# Patient Record
Sex: FEMALE | Race: WHITE | NOT HISPANIC OR LATINO | URBAN - METROPOLITAN AREA
[De-identification: names, ages, dates, MRNs, and addresses within clinical notes are randomized per-mention and may not be internally consistent; named-entity substitution may affect disease eponyms.]

---

## 2022-03-14 ENCOUNTER — EVALUATION (OUTPATIENT)
Dept: PHYSICAL THERAPY | Facility: CLINIC | Age: 37
End: 2022-03-14
Payer: COMMERCIAL

## 2022-03-14 DIAGNOSIS — M62.838 NECK MUSCLE SPASM: ICD-10-CM

## 2022-03-14 DIAGNOSIS — M54.2 NECK PAIN: ICD-10-CM

## 2022-03-14 DIAGNOSIS — M54.12 CERVICAL RADICULOPATHY: Primary | ICD-10-CM

## 2022-03-14 PROCEDURE — 97161 PT EVAL LOW COMPLEX 20 MIN: CPT

## 2022-03-14 NOTE — LETTER
2022    Nicolas [address]eda [phone]    Patient: Christina Kc   YOB: 1985   Date of Visit: 3/14/2022     Encounter Diagnosis     ICD-10-CM    1  Cervical radiculopathy  M54 12    2  Neck pain  M54 2    3  Neck muscle spasm  M62 838        Dear Dr Carmelita Brittle:    Thank you for your recent referral of Christina Kc  Please review the attached evaluation summary from [facility] recent visit  Please verify that you agree with the plan of care by signing the attached order  If you have any questions or concerns, please do not hesitate to call  I sincerely appreciate the opportunity to share in the care of one of your patients and hope to have another opportunity to work with you in the near future  Sincerely,    Alisa Nobles, PT      Referring Provider:      I certify that I have read the below Plan of Care and certify the need for these services furnished under this plan of treatment while under my care  Dali Villa MD  [facility] [address]  Via Fax: [phone]          PT Evaluation     Today's date: 3/14/2022  Patient name: Christina Kc  : 1985  MRN: [id number]  Referring provider: Margot Lanier MD  Dx:   Encounter Diagnosis     ICD-10-CM    1  Cervical radiculopathy  M54 12    2  Neck pain  M54 2    3  Neck muscle spasm  M62 838                   Assessment  Assessment details: Christina Kc is a 43 y o  female who presents with cervical derangement with pain, decreased strength, decreased ROM and postural  dysfunction  Due to these impairments, patient has difficulty performing ADL's, recreational activities, lifting/carrying, reaching  Patient noted to have difficulty completing all activities she needs to do in a day, due to increased pain and weakness in the left side of her neck and into her shoulder   Patient's clinical presentation is consistent with their referring diagnosis of Cervical radiculopathy  Patient has been educated in home exercise program and plan of care  Patient would benefit from skilled physical therapy services to address their aforementioned functional limitations and progress towards prior level of function and independence with home exercise program and self symptom management/resolution  Impairments: abnormal or restricted ROM, abnormal movement, activity intolerance, impaired physical strength, lacks appropriate home exercise program, pain with function, scapular dyskinesis, poor posture  and poor body mechanics  Other impairment: poor tolerance to prolonged static positions  Functional limitations: limited tolerance to driving, using computer, disturbed sleepUnderstanding of Dx/Px/POC: good   Prognosis: good    Goals  Short Term Goals:  4 weeks Target Date 4/11/22  1  Initiate and advance HEP toward self symptom reduction/resolution  2  Improve postural awareness and demonstrate self postural correction  3  Improve AROM cervical spine to min cordero or better t/o   4  Undisturbed sleep  5  Pt to tolerate prolonged driving x 1 hour or more w/o c/o  Long Term Goals:  12 weeks Target Date 6/6/22  1  Indep with HEP and self symptom management/prevention  2  Achieve cervical AROM to WNL w/o c/o   3  Demonstrate ability to lift 15# OH without increased pain,   4  Pt will report improved tolerance for sitting with her long days, using improved posture and with minimal pain reported  5  Patient will demonstrate consistent use of improved posture in clinic with carryover reported at home        Plan  Patient would benefit from: skilled PT and PT eval  Planned modality interventions: thermotherapy: hydrocollator packs, unattended electrical stimulation and cryotherapy  Planned therapy interventions: joint mobilization, manual therapy, patient education, postural training, activity modification, body mechanics training, home exercise program, neuromuscular re-education, strengthening, stretching, therapeutic activities and therapeutic exercise  Other planned therapy interventions: mechanical assessment  Frequency: 2x week  Duration in weeks: 12  Plan of Care beginning date: 3/14/2022  Plan of Care expiration date: 2022  Treatment plan discussed with: patient        Subjective Evaluation    History of Present Illness  Onset date: 1 month ago, recent bout with L side of neck  Mechanism of injury: Patient reports insidious onset of neck pain about 1 month ago  Has a remote history of neck pain with radiating symptoms down her right arm in 2019, for which she received PT at another clinic, and resolved extremity symptoms, but was unable to resolve central neck pain at that time, which resolved over time  Has had neck pain intermittently since then, but now with significant worsening of symptoms in the past month, without resolution or improvement             Recurrent probem    Pain  Current pain ratin (left side cervical spine)  At best pain rating: 3 (over the weekend, no specific change)  At worst pain ratin  Location: L side of neck, L shoulder  Quality: burning and radiating (heaches as well)  Relieving factors: ice and relaxation  Aggravating factors: overhead activity, lifting and sitting  Progression: worsening    Social Support  Steps to enter house: yes  13  Stairs in house: yes   13  Lives in: multiple-level home  Lives with: young children and spouse    Employment status: working (Part time as psychologist, 2 full days)  Hand dominance: left  Exercise history: walking, housework, playing with her children, stretching, pilates      Diagnostic Tests  MRI studies: abnormal  Treatments  Previous treatment: medication and physical therapy (PT for R side neck pain 2019 for several months)  Patient Goals  Patient goals for therapy: decreased pain, independence with ADLs/IADLs, return to sport/leisure activities, increased strength and increased motion  Patient goal: To be able to live without pain        Objective   **=painful  Cervical spine:   ROM:   Flexion: min limitation**  Extension: min limitation**  Sidebend L: mod limitation**  R: mod limitation**  Rotation L: min limitation** R: min limitation**    Repeated movement testing:   Repeated cervical retraction in sitting: 2x10 no significant change in pain but felt "good"  Repeated cervical retraction in supine with towel: x10 mild inc mobility, no sig change in pain but again felt "good"    Palpation: Patient noted to have significant tightness and pain with palpation over cervical and upper trap mm, especially on left side  Patient reported + response to deep pressure in these muscles  Posture: Patient noted to have decreased lumbar lordosis, increased thoracic kyphosis, forward head and rounded shoulders upon PT evaluation  Able to correct posture with cues, and increased ease with use of towel roll for lumbar support  Education provided on posture, cervical positioning, and improving positioning to decrease cervical strain  Precautions: cervical   Daily Exercise Log:    Date 3/14/22        Visit # 1                 Manual         Manual C'spine traction         IASTM UT/LevScap                           Neuro Re-Ed         Postural re-ed HEP        scap depression         Chin tucks supine HEP                                            Ther Ex         superman         Wall slides                                                               Ther Activity                           Gait Training                           Modalities                             HEP:   Access Code: [email or web address]  URL: [email or web address]  Date: 03/14/2022  Prepared by: Hernan Hurley    Exercises  · Supine Cervical Retraction with Towel - 3 x daily - 7 x weekly - 1-2 sets - 10 reps  · Seated Correct Posture - 1 x daily - 7 x weekly - 3 sets - 10 reps

## 2022-03-14 NOTE — PROGRESS NOTES
PT Evaluation     Today's date: 3/14/2022  Patient name: Alia Mcclain  : 1985  MRN: [id number]  Referring provider: Pauline Ro MD  Dx:   Encounter Diagnosis     ICD-10-CM    1  Cervical radiculopathy  M54 12    2  Neck pain  M54 2    3  Neck muscle spasm  M62 838                   Assessment  Assessment details: Alia Mcclain is a 43 y o  female who presents with cervical derangement with pain, decreased strength, decreased ROM and postural  dysfunction  Due to these impairments, patient has difficulty performing ADL's, recreational activities, lifting/carrying, reaching  Patient noted to have difficulty completing all activities she needs to do in a day, due to increased pain and weakness in the left side of her neck and into her shoulder  Patient's clinical presentation is consistent with their referring diagnosis of Cervical radiculopathy  Patient has been educated in home exercise program and plan of care  Patient would benefit from skilled physical therapy services to address their aforementioned functional limitations and progress towards prior level of function and independence with home exercise program and self symptom management/resolution  Impairments: abnormal or restricted ROM, abnormal movement, activity intolerance, impaired physical strength, lacks appropriate home exercise program, pain with function, scapular dyskinesis, poor posture  and poor body mechanics  Other impairment: poor tolerance to prolonged static positions  Functional limitations: limited tolerance to driving, using computer, disturbed sleepUnderstanding of Dx/Px/POC: good   Prognosis: good    Goals  Short Term Goals:  4 weeks Target Date 22  1  Initiate and advance HEP toward self symptom reduction/resolution  2  Improve postural awareness and demonstrate self postural correction  3  Improve AROM cervical spine to min cordero or better t/o   4  Undisturbed sleep    5  Pt to tolerate prolonged driving x 1 hour or more w/o c/o  Long Term Goals:  12 weeks Target Date 22  1  Indep with HEP and self symptom management/prevention  2  Achieve cervical AROM to WNL w/o c/o   3  Demonstrate ability to lift 15# OH without increased pain,   4  Pt will report improved tolerance for sitting with her long days, using improved posture and with minimal pain reported  5  Patient will demonstrate consistent use of improved posture in clinic with carryover reported at home  Plan  Patient would benefit from: skilled PT and PT eval  Planned modality interventions: thermotherapy: hydrocollator packs, unattended electrical stimulation and cryotherapy  Planned therapy interventions: joint mobilization, manual therapy, patient education, postural training, activity modification, body mechanics training, home exercise program, neuromuscular re-education, strengthening, stretching, therapeutic activities and therapeutic exercise  Other planned therapy interventions: mechanical assessment  Frequency: 2x week  Duration in weeks: 12  Plan of Care beginning date: 3/14/2022  Plan of Care expiration date: 2022  Treatment plan discussed with: patient        Subjective Evaluation    History of Present Illness  Onset date: 1 month ago, recent bout with L side of neck  Mechanism of injury: Patient reports insidious onset of neck pain about 1 month ago  Has a remote history of neck pain with radiating symptoms down her right arm in 2019, for which she received PT at another clinic, and resolved extremity symptoms, but was unable to resolve central neck pain at that time, which resolved over time  Has had neck pain intermittently since then, but now with significant worsening of symptoms in the past month, without resolution or improvement             Recurrent probem    Pain  Current pain ratin (left side cervical spine)  At best pain rating: 3 (over the weekend, no specific change)  At worst pain ratin  Location: L side of neck, L shoulder  Quality: burning and radiating (heaches as well)  Relieving factors: ice and relaxation  Aggravating factors: overhead activity, lifting and sitting  Progression: worsening    Social Support  Steps to enter house: yes  13  Stairs in house: yes   13  Lives in: multiple-level home  Lives with: young children and spouse    Employment status: working (Part time as psychologist, 2 full days)  Hand dominance: left  Exercise history: walking, housework, playing with her children, stretching, pilates      Diagnostic Tests  MRI studies: abnormal  Treatments  Previous treatment: medication and physical therapy (PT for R side neck pain 2019 for several months)  Patient Goals  Patient goals for therapy: decreased pain, independence with ADLs/IADLs, return to sport/leisure activities, increased strength and increased motion  Patient goal: To be able to live without pain        Objective   **=painful  Cervical spine:   ROM:   Flexion: min limitation**  Extension: min limitation**  Sidebend L: mod limitation**  R: mod limitation**  Rotation L: min limitation** R: min limitation**    Repeated movement testing:   Repeated cervical retraction in sitting: 2x10 no significant change in pain but felt "good"  Repeated cervical retraction in supine with towel: x10 mild inc mobility, no sig change in pain but again felt "good"    Palpation: Patient noted to have significant tightness and pain with palpation over cervical and upper trap mm, especially on left side  Patient reported + response to deep pressure in these muscles  Posture: Patient noted to have decreased lumbar lordosis, increased thoracic kyphosis, forward head and rounded shoulders upon PT evaluation  Able to correct posture with cues, and increased ease with use of towel roll for lumbar support  Education provided on posture, cervical positioning, and improving positioning to decrease cervical strain           Precautions: cervical   Daily Exercise Log:    Date 3/14/22        Visit # 1                 Manual         Manual C'spine traction         IASTM UT/LevScap                           Neuro Re-Ed         Postural re-ed HEP        scap depression         Chin tucks supine HEP                                            Ther Ex         superman         Wall slides                                                               Ther Activity                           Gait Training                           Modalities                             HEP:   Access Code: [email or web address]  URL: [email or web address]  Date: 03/14/2022  Prepared by: Grace Moon    Exercises  · Supine Cervical Retraction with Towel - 3 x daily - 7 x weekly - 1-2 sets - 10 reps  · Seated Correct Posture - 1 x daily - 7 x weekly - 3 sets - 10 reps

## 2022-03-16 ENCOUNTER — OFFICE VISIT (OUTPATIENT)
Dept: PHYSICAL THERAPY | Facility: CLINIC | Age: 37
End: 2022-03-16
Payer: COMMERCIAL

## 2022-03-16 DIAGNOSIS — M54.12 CERVICAL RADICULOPATHY: Primary | ICD-10-CM

## 2022-03-16 DIAGNOSIS — M62.838 NECK MUSCLE SPASM: ICD-10-CM

## 2022-03-16 DIAGNOSIS — M54.2 NECK PAIN: ICD-10-CM

## 2022-03-16 PROCEDURE — 97112 NEUROMUSCULAR REEDUCATION: CPT

## 2022-03-16 PROCEDURE — 97140 MANUAL THERAPY 1/> REGIONS: CPT

## 2022-03-16 NOTE — PROGRESS NOTES
Daily Note     Today's date: 3/16/2022  Patient name: Maria Fernanda Ruiz  : 1985  MRN: [id number]  Referring provider: Kori Rodarte MD  Dx:   Encounter Diagnosis     ICD-10-CM    1  Cervical radiculopathy  M54 12    2  Neck pain  M54 2    3  Neck muscle spasm  M62 838        Start Time: 0800  Stop Time: 0839  Total time in clinic (min): 39 minutes    Subjective: Patient reporting mildly improved neck pain, but with increased pain reported in lower neck/upper shoulder at top of shoulder blade and along the medial border  Feels good to perform cervical retractions in supine, reports performing 1x while at work  Objective: See treatment diary below      Assessment: Tolerated treatment well and without increased pain  Mildly improved headache noted  Improved scapular pain by end of session with mildly increased neck pain noted  Patient demonstrated fatigue post treatment, exhibited good technique with therapeutic exercises and would benefit from continued PT      Plan: Continue per plan of care  Progress treatment as tolerated  Precautions: cervical   Daily Exercise Log:    Date 3/14/22 3/16/22       Visit # 1 2                Manual  20'       Manual C'spine traction  LS-5'       IASTM UT/LevScap  LS       subscap release  LS       STM rhomboids, lev scap  LS       PA mobs cervical/thoracic  LS                         Neuro Re-Ed  10'       Postural re-ed HEP        scap depression         Chin tucks supine HEP 10x 5" hold       shld flx w/ back to wall  2x10       Wall angels  2x10       superman                  Ther Ex  5'       HEP  Updated & reviewed       Wall slides  15" x 10                                                             Ther Activity                           Gait Training                           Modalities         Mechanical traction                    HEP:   Access Code: [email or web address]  URL: [email or web address]  Date: 2022  Prepared by: Sunil roman Pilo    Exercises  · Supine Cervical Retraction with Towel - 3 x daily - 7 x weekly - 1-2 sets - 10 reps  · Seated Correct Posture - 1 x daily - 7 x weekly - 3 sets - 10 reps

## 2022-03-21 ENCOUNTER — OFFICE VISIT (OUTPATIENT)
Dept: PHYSICAL THERAPY | Facility: CLINIC | Age: 37
End: 2022-03-21
Payer: COMMERCIAL

## 2022-03-21 DIAGNOSIS — M62.838 NECK MUSCLE SPASM: ICD-10-CM

## 2022-03-21 DIAGNOSIS — M54.12 CERVICAL RADICULOPATHY: Primary | ICD-10-CM

## 2022-03-21 DIAGNOSIS — M54.2 NECK PAIN: ICD-10-CM

## 2022-03-21 PROCEDURE — 97112 NEUROMUSCULAR REEDUCATION: CPT

## 2022-03-21 PROCEDURE — 97140 MANUAL THERAPY 1/> REGIONS: CPT

## 2022-03-21 NOTE — PROGRESS NOTES
Daily Note     Today's date: 3/21/2022  Patient name: Jose Courtney  : 1985  MRN: [id number]  Referring provider: Laureano Tapia MD  Dx:   Encounter Diagnosis     ICD-10-CM    1  Cervical radiculopathy  M54 12    2  Neck pain  M54 2    3  Neck muscle spasm  M62 838        Start Time: 0845  Stop Time: 0930  Total time in clinic (min): 45 minutes    Subjective: Patient reports having increased neck spasm this morning upon awakening  Patient reports no significant strain, but was reclining on her bed yesterday with her head somewhat forward  Patient reports that her symptoms are gradually improving since start of care  Objective: See treatment diary below      Assessment: Tolerated treatment well and with improved symptoms by completion of session  Patient progressing with decreasing pain and improving ROM noted at end of session  Patient demonstrated fatigue post treatment, exhibited good technique with therapeutic exercises and would benefit from continued PT      Plan: Continue per plan of care  Progress treatment as tolerated         Precautions: cervical   Daily Exercise Log:    Date 3/14/22 3/16/22 3/21/22      Visit # 1 2 3               Manual  20' 15'      Manual C'spine traction  LS-5' Traction/retract/ext 3x12 reps      IASTM UT/LevScap  LS LS-4'      subscap release  LS       STM   rhomboids, lev scap-LS Upper traps, lev scap, C' paraspinals-LS      PA mobs cervical/thoracic  LS Seated rocabado 2x15                        Neuro Re-Ed  10' 15'      Postural re-ed HEP        scap depression   Supine 10x 10" hold      Chin tucks supine HEP 10x 5" hold 2x10 5" hold      shld flx w/ back to wall  2x10 Supine 2x10      Wall angels  2x10       superman         ALYX w/ cerv retract   2x10                                          Ther Ex  5' 5'      HEP  Updated & reviewed       Wall slides  15" x 10 trialed 3x, poor jess inc L shld pain today      cervical sidebend   R x 10, no sig change Ther Activity                           Gait Training                           Modalities         Mechanical traction                    HEP:   Access Code: [email or web address]  URL: [email or web address]  Date: 03/14/2022  Updated: 03/21/2022  Prepared by: Brayan Mohan    Exercises  · Supine Cervical Retraction with Towel - 3 x daily - 7 x weekly - 1-2 sets - 10 reps  · Seated Correct Posture - 1 x daily - 7 x weekly - 3 sets - 10 reps  · Seated Thoracic Lumbar Extension - 2 x daily - 7 x weekly - 2 sets - 10 reps  · Standing shoulder flexion wall slides - 1 x daily - 7 x weekly - 1 sets - 5 reps - 20 second hold  · Standing Shoulder Posterior Capsule Stretch - 1 x daily - 7 x weekly - 1 sets - 5 reps - 20 second hold  · Doorway Rhomboid Stretch - 1 x daily - 7 x weekly - 1 sets - 5 reps - 20 second hold

## 2022-03-24 ENCOUNTER — OFFICE VISIT (OUTPATIENT)
Dept: PHYSICAL THERAPY | Facility: CLINIC | Age: 37
End: 2022-03-24
Payer: COMMERCIAL

## 2022-03-24 DIAGNOSIS — M54.12 CERVICAL RADICULOPATHY: Primary | ICD-10-CM

## 2022-03-24 DIAGNOSIS — M54.2 NECK PAIN: ICD-10-CM

## 2022-03-24 DIAGNOSIS — M62.838 NECK MUSCLE SPASM: ICD-10-CM

## 2022-03-24 PROCEDURE — 97110 THERAPEUTIC EXERCISES: CPT

## 2022-03-24 PROCEDURE — 97140 MANUAL THERAPY 1/> REGIONS: CPT

## 2022-03-24 NOTE — PROGRESS NOTES
Daily Note     Today's date: 3/24/2022  Patient name: Chuckie Avalos  : 1985  MRN: [id number]  Referring provider: Michelle Dietrich MD  Dx:   Encounter Diagnosis     ICD-10-CM    1  Cervical radiculopathy  M54 12    2  Neck pain  M54 2    3  Neck muscle spasm  M62 838        Start Time: 0845  Stop Time: 930  Total time in clinic (min): 45 minutes    Subjective: Patient reports doing better than Monday  Pain at rest rated 1-2/10, with rotation L 5-6/10, with sidebend left 7/10  Pain primarily down at lev scap insertion  Has been consistently performing cervical retractions throughout her day  Objective: See treatment diary below      Assessment: Tolerated treatment well and with best response to seated P-A mobs (rocabado) with improved ROM and dec pain noted after  Patient reports she will attempt to have her  replicate at home for carryover of improvement  Therapist and patient brainstormed other ideas, with patient to trial resting her arms on her desk and dropping her chest down from sitting  Patient demonstrated fatigue post treatment, exhibited good technique with therapeutic exercises and would benefit from continued PT to progress activities and exercises  Also added cervical retraction in prone on elbows, which has had some effect in clinic  Plan: Continue per plan of care  Progress treatment as tolerated  Patient will report back on tasks being trialed at home        Precautions: cervical   Daily Exercise Log:    Date 3/14/22 3/16/22 3/21/22 3/24/22     Visit # 1 2 3 4              Manual  20' 15' 15'     Manual C'spine traction  LS-5' Traction/retract/ext 3x12 reps P/A mobs prone to C'&T' spine     IASTM UT/LevScap  LS LS-4'      subscap release  LS       STM   rhomboids, lev scap-LS Upper traps, lev scap, C' paraspinals-LS 5' to lev scap insertion     PA mobs cervical/thoracic  LS Seated rocabado 2x15 Seated rocabado 2x10 reps repeated 5x during session-dec sx Neuro Re-Ed  10' 15' 5'     Postural re-ed HEP        scap depression   Supine 10x 10" hold      Chin tucks supine HEP 10x 5" hold 2x10 5" hold      shld flx w/ back to wall  2x10 Supine 2x10      Wall angels  2x10       superman         ALYX w/ cerv retract   2x10 2x10     Prone shoulder ext w/ scap set    2x10                                Ther Ex  5' 5' 10'     HEP  Updated & reviewed       Wall slides  15" x 10 trialed 3x, poor jess inc L shld pain today 15x with mild inc scap pain     cervical sidebend   R x 10, no sig change      scap retract    2x10 vs RTB                                         Ther Activity                           Gait Training                           Modalities         Mechanical traction    5' @ 19#, 5'x20#                HEP:   Access Code: [email or web address]  URL: [email or web address]  Date: 03/14/2022  Updated: 03/21/2022  Prepared by: Imelda Pérez    Exercises  · Supine Cervical Retraction with Towel - 3 x daily - 7 x weekly - 1-2 sets - 10 reps  · Seated Correct Posture - 1 x daily - 7 x weekly - 3 sets - 10 reps  · Seated Thoracic Lumbar Extension - 2 x daily - 7 x weekly - 2 sets - 10 reps  · Standing shoulder flexion wall slides - 1 x daily - 7 x weekly - 1 sets - 5 reps - 20 second hold  · Standing Shoulder Posterior Capsule Stretch - 1 x daily - 7 x weekly - 1 sets - 5 reps - 20 second hold  · Doorway Rhomboid Stretch - 1 x daily - 7 x weekly - 1 sets - 5 reps - 20 second hold

## 2022-03-28 ENCOUNTER — OFFICE VISIT (OUTPATIENT)
Dept: PHYSICAL THERAPY | Facility: CLINIC | Age: 37
End: 2022-03-28
Payer: COMMERCIAL

## 2022-03-28 DIAGNOSIS — M54.12 CERVICAL RADICULOPATHY: Primary | ICD-10-CM

## 2022-03-28 DIAGNOSIS — M62.838 NECK MUSCLE SPASM: ICD-10-CM

## 2022-03-28 DIAGNOSIS — M54.2 NECK PAIN: ICD-10-CM

## 2022-03-28 PROCEDURE — 97110 THERAPEUTIC EXERCISES: CPT

## 2022-03-28 PROCEDURE — 97112 NEUROMUSCULAR REEDUCATION: CPT

## 2022-03-28 NOTE — PROGRESS NOTES
Daily Note     Today's date: 3/28/2022  Patient name: Hamilton Calix  : 1985  MRN: [id number]  Referring provider: Marilee Benito MD  Dx:   Encounter Diagnosis     ICD-10-CM    1  Cervical radiculopathy  M54 12    2  Neck pain  M54 2    3  Neck muscle spasm  M62 838        Start Time: 0845  Stop Time: 930  Total time in clinic (min): 45 minutes    Subjective: Patient arrives reporting significantly improved symptoms since last week's session, with pt teaching her  how to perform thoracic mobilization that is helping and finding another way to perform for herself using coffee table in kneeling  Patient reports she was sore after sitting for Religion yesterday but only in her neck and with being able to function pretty well for the rest of the day after resting for 20-30 minutes in supine  Objective: See treatment diary below      Assessment: Tolerated treatment well and with improved symptoms overall  Patient continues to progress in a positive manner, with continued gradual decrease in symptoms noted  Patient demonstrated fatigue post treatment, exhibited good technique with therapeutic exercises and would benefit from continued PT      Plan: Continue per plan of care  Progress treatment as tolerated         Precautions: cervical   Daily Exercise Log:    Date 3/14/22 3/16/22 3/21/22 3/24/22 3/28/22    Visit # 1 2 3 4 5             Manual  20' 13' 15' 6'    Manual C'spine traction  LS-5' Traction/retract/ext 3x12 reps P/A mobs prone to C'&T' spine     IASTM UT/LevScap  LS LS-4'      subscap release  LS       STM   rhomboids, lev scap-LS Upper traps, lev scap, C' paraspinals-LS 5' to lev scap insertion     PA mobs cervical/thoracic  LS Seated rocabado 2x15 Seated rocabado 2x10 reps repeated 5x during session-dec sx Seated rocabado x15-20, performed 4x during session, with dec pain noted                      Neuro Re-Ed  10' 15' 5' 8'    Postural re-ed HEP        scap depression   Supine 10x 10" hold      Chin tucks supine HEP 10x 5" hold 2x10 5" hold  Seated 10x, inc lev scap pain    shld flx w/ back to wall  2x10 Supine 2x10  Supine 2x10    Wall angels  2x10   2x10    superman         ALYX w/ cerv retract   2x10 2x10     Prone shoulder ext w/ scap set    2x10                                Ther Ex  5' 5' 10' 16'    HEP  Updated & reviewed       Wall slides  15" x 10 trialed 3x, poor jess inc L shld pain today 15x with mild inc scap pain 10x    cervical sidebend   R x 10, no sig change  10x without inc pain    scap retract    2x10 vs RTB 2x15 yellow tubing    B/L shld ext     2x15 yellow tubing    Pt education     Reviewed positions in [facility], discussion for improved mobility                      Ther Activity                           Gait Training                           Modalities         Mechanical traction    5' @ 19#, 5'x20# 2x5' 19# first,   20# second               HEP:   Access Code: [email or web address]  URL: [email or web address]  Date: 03/14/2022  Updated: 03/21/2022  Prepared by: Caridad Nunez    Exercises  · Supine Cervical Retraction with Towel - 3 x daily - 7 x weekly - 1-2 sets - 10 reps  · Seated Correct Posture - 1 x daily - 7 x weekly - 3 sets - 10 reps  · Seated Thoracic Lumbar Extension - 2 x daily - 7 x weekly - 2 sets - 10 reps  · Standing shoulder flexion wall slides - 1 x daily - 7 x weekly - 1 sets - 5 reps - 20 second hold  · Standing Shoulder Posterior Capsule Stretch - 1 x daily - 7 x weekly - 1 sets - 5 reps - 20 second hold  · Doorway Rhomboid Stretch - 1 x daily - 7 x weekly - 1 sets - 5 reps - 20 second hold

## 2022-03-31 ENCOUNTER — OFFICE VISIT (OUTPATIENT)
Dept: PHYSICAL THERAPY | Facility: CLINIC | Age: 37
End: 2022-03-31
Payer: COMMERCIAL

## 2022-03-31 DIAGNOSIS — M54.12 CERVICAL RADICULOPATHY: Primary | ICD-10-CM

## 2022-03-31 DIAGNOSIS — M54.2 NECK PAIN: ICD-10-CM

## 2022-03-31 DIAGNOSIS — M62.838 NECK MUSCLE SPASM: ICD-10-CM

## 2022-03-31 PROCEDURE — 97112 NEUROMUSCULAR REEDUCATION: CPT | Performed by: PHYSICAL THERAPIST

## 2022-03-31 PROCEDURE — 97140 MANUAL THERAPY 1/> REGIONS: CPT | Performed by: PHYSICAL THERAPIST

## 2022-03-31 NOTE — PROGRESS NOTES
Daily Note     Today's date: 3/31/2022  Patient name: Medhat Nuñez  : 1985  MRN: [id number]  Referring provider: Cate Maldonado MD  Dx:   Encounter Diagnosis     ICD-10-CM    1  Cervical radiculopathy  M54 12    2  Neck pain  M54 2    3  Neck muscle spasm  M62 838                   Subjective: Pt reports she sees good progress  She will still get pain w/ prolonged sitting activities, for example she played the piano for an hour and 20 minutes 2 days ago and she was able to greatly reduce or resolve her pain afterward w/ her HEP  She enters w/ 3/10 L cerv pain radiating to her shoulder  Objective: See treatment diary below  Rep cerv retr/extension supine off edge 1x10 =  Dec/B      Assessment: Tolerated treatment well  Patient responding well to extension principle w/ some gentle force progression alternatives  Plan: Continue per plan of care        Precautions: cervical  FOTO: 3/28/2022   Daily Exercise Log:    Date 3/14/22 3/16/22 3/21/22 3/24/22 3/28/22 3/31/2022   Visit # 1 2 3 4 5 6            Manual  20' 15' 15' 6' 10'   Manual C'spine traction  LS-5' Traction/retract/ext 3x12 reps P/A mobs prone to C'&T' spine     IASTM UT/LevScap  LS LS-4'      subscap release  LS       STM   rhomboids, lev scap-LS Upper traps, lev scap, C' paraspinals-LS 5' to lev scap insertion     PA mobs cervical/thoracic  LS Seated rocabado 2x15 Seated rocabado 2x10 reps repeated 5x during session-dec sx Seated rocabado x15-20, performed 4x during session, with dec pain noted Seated rocabado 20x upper to mid thoracic; sit thor ext w/ clinician OP 2x5                     Neuro Re-Ed  10' 15' 5' 8' 15'   Postural re-ed HEP        scap depression   Supine 10x 10" hold      Chin tucks supine HEP 10x 5" hold 2x10 5" hold  Seated 10x, inc lev scap pain    shld flx w/ back to wall  2x10 Supine 2x10  Supine 2x10 @ wall w/ missael foam roll 2x10   Wall angels  2x10   2x10 2x10   superman         ALYX w/ cerv retract   2x10 2x10 Prone shoulder ext w/ scap set    2x10     cerv retraction w/ extension      1x10 sit  1x10 supine off edge   B/L ER w/ scap set      RTB 2x10            Ther Ex  5' 5' 10' 16' 5'   HEP  Updated & reviewed       Wall slides  15" x 10 trialed 3x, poor jess inc L shld pain today 15x with mild inc scap pain 10x 1x10   cervical sidebend   R x 10, no sig change  10x without inc pain    scap retract    2x10 vs RTB 2x15 yellow tubing    B/L shld ext     2x15 yellow tubing    Pt education     Reviewed positions in [facility], discussion for improved mobility                      Ther Activity                           Gait Training                           Modalities         Mechanical traction    5' @ 19#, 5'x20# 2x5' 19# first,   20# second 18# 5'x2              HEP:   Access Code: [email or web address]  URL: [email or web address]  Date: 03/14/2022  Updated: 03/21/2022  Prepared by: Scarlett South    Exercises  · Supine Cervical Retraction with Towel - 3 x daily - 7 x weekly - 1-2 sets - 10 reps  · Seated Correct Posture - 1 x daily - 7 x weekly - 3 sets - 10 reps  · Seated Thoracic Lumbar Extension - 2 x daily - 7 x weekly - 2 sets - 10 reps  · Standing shoulder flexion wall slides - 1 x daily - 7 x weekly - 1 sets - 5 reps - 20 second hold  · Standing Shoulder Posterior Capsule Stretch - 1 x daily - 7 x weekly - 1 sets - 5 reps - 20 second hold  · Doorway Rhomboid Stretch - 1 x daily - 7 x weekly - 1 sets - 5 reps - 20 second hold

## 2022-04-04 ENCOUNTER — OFFICE VISIT (OUTPATIENT)
Dept: PHYSICAL THERAPY | Facility: CLINIC | Age: 37
End: 2022-04-04
Payer: COMMERCIAL

## 2022-04-04 DIAGNOSIS — M54.2 NECK PAIN: ICD-10-CM

## 2022-04-04 DIAGNOSIS — M62.838 NECK MUSCLE SPASM: ICD-10-CM

## 2022-04-04 DIAGNOSIS — M54.12 CERVICAL RADICULOPATHY: Primary | ICD-10-CM

## 2022-04-04 PROCEDURE — 97112 NEUROMUSCULAR REEDUCATION: CPT

## 2022-04-04 NOTE — PROGRESS NOTES
Daily Note     Today's date: 2022  Patient name: Debbie Hyde  : 1985  MRN: [id number]  Referring provider: Terri Condon MD  Dx:   Encounter Diagnosis     ICD-10-CM    1  Cervical radiculopathy  M54 12    2  Neck pain  M54 2    3  Neck muscle spasm  M62 838        Start Time: 0850  Stop Time: 0930  Total time in clinic (min): 40 minutes    Subjective: Reports feeling better overall since last session  Improved ability to sit during Mandaen, with ability to stay for luncheon after Mandaen also noted  Used exercises intermittently  Objective: See treatment diary below      Assessment: Tolerated treatment well and without increased pain  Patient demonstrated fatigue post treatment, exhibited good technique with therapeutic exercises and would benefit from continued PT  Improving overall mobility and decreased pain noted overall  Plan: Continue per plan of care  Progress treatment as tolerated         Precautions: cervical  FOTO: 3/28/2022   Daily Exercise Log:    Date 4/4/22     3/31/2022   Visit # 7     6            Manual      10'   Manual C'spine traction         IASTM UT/LevScap         subscap release         STM          PA mobs cervical/thoracic      Seated rocabado 20x upper to mid thoracic; sit thor ext w/ clinician OP 2x5                     Neuro Re-Ed 15'     15'   Postural re-ed         scap depression         Chin tucks supine         shld flx w/ back to wall W/ half roll 2x10 1#     @ wall w/ missael foam roll 2x10   Wall angels W/ half roll 2x10 1#     2x10   superman         ALYX w/ cerv retract         Prone shoulder ext w/ scap set         cerv retraction w/ extension 2x10 off edge + 10x off edge     1x10 sit  1x10 supine off edge   B/L ER w/ scap set YTB 2x10     RTB 2x10            Ther Ex      5'   HEP updated        Wall slides      1x10   cervical sidebend         scap retract 2x10 Red tubing         B/L shld ext 2x10 Red tubing         Pt education Ther Activity                           Gait Training                           Modalities         Mechanical traction 20# x8', good tolerance     18# 5'x2              HEP:   Access Code: [email or web address]  URL: [email or web address]  Date: 03/14/2022  Updated: 03/21/2022  Prepared by: Danny Primrose    Exercises  · Supine Cervical Retraction with Towel - 3 x daily - 7 x weekly - 1-2 sets - 10 reps  · Seated Correct Posture - 1 x daily - 7 x weekly - 3 sets - 10 reps  · Seated Thoracic Lumbar Extension - 2 x daily - 7 x weekly - 2 sets - 10 reps  · Standing shoulder flexion wall slides - 1 x daily - 7 x weekly - 1 sets - 5 reps - 20 second hold  · Standing Shoulder Posterior Capsule Stretch - 1 x daily - 7 x weekly - 1 sets - 5 reps - 20 second hold  · Doorway Rhomboid Stretch - 1 x daily - 7 x weekly - 1 sets - 5 reps - 20 second hold

## 2022-04-07 ENCOUNTER — OFFICE VISIT (OUTPATIENT)
Dept: PHYSICAL THERAPY | Facility: CLINIC | Age: 37
End: 2022-04-07
Payer: COMMERCIAL

## 2022-04-07 DIAGNOSIS — M62.838 NECK MUSCLE SPASM: ICD-10-CM

## 2022-04-07 DIAGNOSIS — M54.12 CERVICAL RADICULOPATHY: Primary | ICD-10-CM

## 2022-04-07 DIAGNOSIS — M54.2 NECK PAIN: ICD-10-CM

## 2022-04-07 PROCEDURE — 97110 THERAPEUTIC EXERCISES: CPT

## 2022-04-07 PROCEDURE — 97112 NEUROMUSCULAR REEDUCATION: CPT

## 2022-04-07 NOTE — PROGRESS NOTES
Daily Note     Today's date: 2022  Patient name: Kayleen Francisco  : 1985  MRN: [id number]  Referring provider: Silvestre Edwards MD  Dx:   Encounter Diagnosis     ICD-10-CM    1  Cervical radiculopathy  M54 12    2  Neck pain  M54 2    3  Neck muscle spasm  M62 838        Start Time: 0845  Stop Time: 30  Total time in clinic (min): 45 minutes    Subjective: Patient arrives reporting she has increased neck pain in the left side of her neck today, which has not been responding to cervical retractions or thoracic extension  Saw orthopedic who is in agreement with continued skilled PT at this time  Objective: See treatment diary below      Assessment: Tolerated treatment well and with improvement in symptoms  Patient progressing well with functional tasks, with continued improvements in symptoms with cervical traction  Patient demonstrated fatigue post treatment, exhibited good technique with therapeutic exercises and would benefit from continued PT      Plan: Continue per plan of care  Progress treatment as tolerated         Precautions: cervical  FOTO: 3/28/2022   Daily Exercise Log:    Date 22       Visit # 7 8                Manual         Manual C'spine traction  5'       IASTM UT/LevScap         subscap release         STM          PA mobs cervical/thoracic                           Neuro Re-Ed 15' 10'       Postural re-ed         scap depression         Chin tucks supine         shld flx w/ back to wall W/ half roll 2x10 1#        Wall angels W/ half roll 2x10 1#        superman         ALYX w/ cerv retract         Prone shoulder ext w/ scap set         cerv retraction w/ extension 2x10 off edge + 10x off edge 2x10 off edge + 10       B/L ER w/ scap set YTB 2x10 YTB 2x10                Ther Ex  16'       HEP updated        Wall slides  2x10, 5" hold       cervical sidebend         scap retract 2x10 Red tubing         B/L shld ext 2x10 Red tubing         Pt education  Reviewed symptoms, progression of mobility       Posterior capsule stretch  R/L 2x10"       Thoracic ext over blue med ball  2x10                                  Ther Activity                           Gait Training                           Modalities         Mechanical traction 20# x8', good tolerance 22# x 8', improved sx                  HEP:   Access Code: [email or web address]  URL: [email or web address]  Date: 03/14/2022  Updated: 03/21/2022  Prepared by: Raciel Wilkes    Exercises  · Supine Cervical Retraction with Towel - 3 x daily - 7 x weekly - 1-2 sets - 10 reps  · Seated Correct Posture - 1 x daily - 7 x weekly - 3 sets - 10 reps  · Seated Thoracic Lumbar Extension - 2 x daily - 7 x weekly - 2 sets - 10 reps  · Standing shoulder flexion wall slides - 1 x daily - 7 x weekly - 1 sets - 5 reps - 20 second hold  · Standing Shoulder Posterior Capsule Stretch - 1 x daily - 7 x weekly - 1 sets - 5 reps - 20 second hold  · Doorway Rhomboid Stretch - 1 x daily - 7 x weekly - 1 sets - 5 reps - 20 second hold

## 2022-04-11 ENCOUNTER — OFFICE VISIT (OUTPATIENT)
Dept: PHYSICAL THERAPY | Facility: CLINIC | Age: 37
End: 2022-04-11
Payer: COMMERCIAL

## 2022-04-11 DIAGNOSIS — M54.2 NECK PAIN: ICD-10-CM

## 2022-04-11 DIAGNOSIS — M54.12 CERVICAL RADICULOPATHY: Primary | ICD-10-CM

## 2022-04-11 DIAGNOSIS — M62.838 NECK MUSCLE SPASM: ICD-10-CM

## 2022-04-11 PROCEDURE — 97112 NEUROMUSCULAR REEDUCATION: CPT

## 2022-04-11 PROCEDURE — 97110 THERAPEUTIC EXERCISES: CPT

## 2022-04-11 NOTE — PROGRESS NOTES
Daily Note     Today's date: 2022  Patient name: Vinny Betancourt  : 1985  MRN: [id number]  Referring provider: Lulu Albarran MD  Dx:   Encounter Diagnosis     ICD-10-CM    1  Cervical radiculopathy  M54 12    2  Neck pain  M54 2    3  Neck muscle spasm  M62 838        Start Time: 0845  Stop Time: 930  Total time in clinic (min): 45 minutes    Subjective: Patient reports improving symptoms, with increasing ability to manage symptoms without therapist input  Reports decreased pain after sitting in [employer] and at family function yesterday  4/10 pain rated at lev scap insertion  Isolated compared to previously  Objective: See treatment diary below      Assessment: Tolerated treatment well and with abolishment of upper cervical pain with use of mechanical traction unit  Increasing independence in management of lower cervical pain, with good carryover of HEP noted  Patient demonstrated fatigue post treatment, exhibited good technique with therapeutic exercises and would benefit from continued PT      Plan: Continue per plan of care  Progress treatment as tolerated         Precautions: cervical  FOTO: 3/28/2022   Daily Exercise Log:    Date 22      Visit # 7 8 9               Manual         Manual C'spine traction  5'       IASTM UT/LevScap         subscap release         STM          PA mobs cervical/thoracic                           Neuro Re-Ed 13' 10' 20'      shld flx w/ back to wall W/ half roll 2x10 1#        Wall angels W/ half roll 2x10 1#        superman         cerv retraction w/ extension 2x10 off edge + 10x off edge 2x10 off edge + 10 2x10 off edge      B/L ER w/ scap set YTB 2x10 YTB 2x10       Open books   1# 2x10 R/L      [place] pulldowns   4 7# 2x10      [place] rows   4 7# 2x10               Ther Ex  16' 15'      HEP updated        Wall slides  2x10, 5" hold 10x 5" hold      scap retract 2x10 Red tubing         B/L shld ext 2x10 Red tubing         Pt education Reviewed symptoms, progression of mobility       Posterior capsule stretch  R/L 2x10"       Thoracic ext over blue med ball  2x10 2x10      B/L shld flx supine   2x10 2# DB      B/L chest press sup   2x10 2# DB               Ther Activity                           Gait Training                           Modalities         Mechanical traction 20# x8', good tolerance 22# x 8', improved sx 22# x5' abolished sx                 HEP:   Access Code: [email or web address]  URL: [email or web address]  Date: 03/14/2022  Updated: 03/21/2022  Prepared by: Raciel Wilkes    Exercises  · Supine Cervical Retraction with Towel - 3 x daily - 7 x weekly - 1-2 sets - 10 reps  · Seated Correct Posture - 1 x daily - 7 x weekly - 3 sets - 10 reps  · Seated Thoracic Lumbar Extension - 2 x daily - 7 x weekly - 2 sets - 10 reps  · Standing shoulder flexion wall slides - 1 x daily - 7 x weekly - 1 sets - 5 reps - 20 second hold  · Standing Shoulder Posterior Capsule Stretch - 1 x daily - 7 x weekly - 1 sets - 5 reps - 20 second hold  · Doorway Rhomboid Stretch - 1 x daily - 7 x weekly - 1 sets - 5 reps - 20 second hold

## 2022-04-14 ENCOUNTER — EVALUATION (OUTPATIENT)
Dept: PHYSICAL THERAPY | Facility: CLINIC | Age: 37
End: 2022-04-14
Payer: COMMERCIAL

## 2022-04-14 DIAGNOSIS — M54.12 CERVICAL RADICULOPATHY: Primary | ICD-10-CM

## 2022-04-14 DIAGNOSIS — M62.838 NECK MUSCLE SPASM: ICD-10-CM

## 2022-04-14 DIAGNOSIS — M54.2 NECK PAIN: ICD-10-CM

## 2022-04-14 PROCEDURE — 97110 THERAPEUTIC EXERCISES: CPT

## 2022-04-14 NOTE — PROGRESS NOTES
PT Re-Evaluation     Today's date: 2022  Patient name: Darlene Harris  : 1985  MRN: [id number]  Referring provider: Cole Cole MD  Dx:   Encounter Diagnosis     ICD-10-CM    1  Cervical radiculopathy  M54 12    2  Neck pain  M54 2    3  Neck muscle spasm  M62 838        Start Time: 0845  Stop Time: 0930  Total time in clinic (min): 45 minutes    Assessment  Assessment details: 22: Patient noted to have significantly increased mobility, tolerance to sitting and standing and ability to perform household tasks  Decreased need for rest breaks, with continued increased pain at the end of the day and difficulty lifting/reaching overhead  Patient notes gradually improving overall mobility, with continued decreased scapular stabilization demonstrated  Patient requires continued skilled PT to address UE weakness, scapular stability and to abolish cervical pain and improve ability for patient to resolve pain without therapist assistance  Patient will benefit from an additional 4 weeks of physical therapy to return to prior level of function and abolish pain  Progressing well with all activities and exercises  3/14/22: Darlene Harris is a 43 y o  female who presents with cervical derangement with pain, decreased strength, decreased ROM and postural  dysfunction  Due to these impairments, patient has difficulty performing ADL's, recreational activities, lifting/carrying, reaching  Patient noted to have difficulty completing all activities she needs to do in a day, due to increased pain and weakness in the left side of her neck and into her shoulder  Patient's clinical presentation is consistent with their referring diagnosis of Cervical radiculopathy  Patient has been educated in home exercise program and plan of care   Patient would benefit from skilled physical therapy services to address their aforementioned functional limitations and progress towards prior level of function and independence with home exercise program and self symptom management/resolution  Impairments: abnormal or restricted ROM, abnormal movement, activity intolerance, impaired physical strength, lacks appropriate home exercise program, pain with function, scapular dyskinesis, poor posture  and poor body mechanics  Other impairment: poor tolerance to prolonged static positions  Functional limitations: limited tolerance to driving, using computerUnderstanding of Dx/Px/POC: good   Prognosis: good    Goals  Short Term Goals:  4 weeks Target Date 4/11/22  1  Initiate and advance HEP toward self symptom reduction/resolution  MET  2  Improve postural awareness and demonstrate self postural correction  MET  3  Improve AROM cervical spine to min cordero or better t/o  MET  4  Undisturbed sleep  MET  5  Pt to tolerate prolonged driving x 1 hour or more w/o c/o  MET      Long Term Goals:  12 weeks Target Date 6/6/22 all progressing  1  Indep with HEP and self symptom management/prevention  2  Achieve cervical AROM to WNL w/o c/o   3  Demonstrate ability to lift 15# OH without increased pain  4  Pt will report improved tolerance for sitting with her long days, using improved posture and with minimal pain reported  5  Patient will demonstrate consistent use of improved posture in clinic with carryover reported at home        Plan  Patient would benefit from: skilled PT and PT eval  Planned modality interventions: thermotherapy: hydrocollator packs, unattended electrical stimulation and cryotherapy  Planned therapy interventions: joint mobilization, manual therapy, patient education, postural training, body mechanics training, home exercise program, neuromuscular re-education, strengthening, stretching, therapeutic activities and therapeutic exercise  Other planned therapy interventions: mechanical assessment  Frequency: 2x week  Duration in weeks: 12  Plan of Care beginning date: 3/14/2022  Plan of Care expiration date: 6/6/2022  Treatment plan discussed with: patient        Subjective Evaluation    History of Present Illness  Onset date: 1 month prior to start of care, recent bout with L side of neck  Mechanism of injury: Patient reports insidious onset of neck pain about 1 month prior to start of care  Has a remote history of neck pain with radiating symptoms down her right arm in 2019, for which she received PT at another clinic, and resolved extremity symptoms, but was unable to resolve central neck pain at that time, which resolved over time  Has had neck pain intermittently since then, but now with significant worsening of symptoms in the past month, without resolution or improvement  Recurrent probem    Quality of life: good (Improving since start of care)    Pain  Current pain ratin (left side cervical spine)  At best pain ratin (always a little something)  At worst pain ratin  Location: L side of neck  Quality: burning, radiating, sharp and tight (Headaches)  Relieving factors: ice and relaxation  Aggravating factors: overhead activity, lifting and sitting  Progression: improved    Social Support  Steps to enter house: yes  13  Stairs in house: yes   13  Lives in: multiple-level home  Lives with: young children and spouse    Employment status: working (Part time as psychologist, 2 full days)  Hand dominance: left  Exercise history: walking, housework, playing with her children, stretching, pilates      Diagnostic Tests  MRI studies: abnormal  Treatments  Previous treatment: medication and physical therapy (PT for R side neck pain 2019 for several months)  Current treatment: physical therapy  Patient Goals  Patient goals for therapy: decreased pain, independence with ADLs/IADLs, return to sport/leisure activities and increased strength  Patient goal: To be able to have periods of no pain  Improve ability to abolish pain on my own          Objective   *=painful  Cervical spine:   ROM: updated 22  Flexion: nil limitation  Extension: nil limitation   Sidebend L: min limitation*  R: min limitation  Rotation L: min limitation R: min limitation    Repeated movement testin22: Patient has been taken through significant progression of cervical retraction and extension progression with + abolishment of pain with supine retraction extension off edge of mat or bed  Improving consistency of performance at home and in work setting, with decreasing pain noted  Also utilizing thoracic extension over chair to decrease shoulder/scapular pain  Improving ability to manage pain without therapist assistance, with continued need for traction to fully abolish pain in L side of neck  3/14/22: Repeated cervical retraction in sitting: 2x10 no significant change in pain but felt "good"  Repeated cervical retraction in supine with towel: x10 mild inc mobility, no sig change in pain but again felt "good"    Palpation:   22: Patient noted to have one area of tightness in L side of neck along scalenes, and at times, in L levator scapula  3/14/22Patient noted to have significant tightness and pain with palpation over cervical and upper trap mm, especially on left side  Patient reported + response to deep pressure in these muscles  Posture:   22: Pt noted to have significantly improved posture, with upright posture noted in waiting area and when in clinic  Significant carryover of posture and stretching which is resulting in significant improvement in pain  3/14/22: Patient noted to have decreased lumbar lordosis, increased thoracic kyphosis, forward head and rounded shoulders upon PT evaluation  Able to correct posture with cues, and increased ease with use of towel roll for lumbar support  Education provided on posture, cervical positioning, and improving positioning to decrease cervical strain      UE strength:   L shoulder grossly 4-/5, R shoulder 4/5 with continued L scapular winging noted         Precautions: cervical Daily Exercise Log:    Date 4/4/22 4/7/22 4/11/22 4/14/22     Visit # 7 8 9 10 (re-eval)              Manual         Manual C'spine traction  5'       IASTM UT/LevScap         subscap release         STM          PA mobs cervical/thoracic                           Neuro Re-Ed 13' 10' 20' 10'     shld flx w/ back to wall W/ half roll 2x10 1#        Wall angels W/ half roll 2x10 1#        superman    5" hold 1x10     cerv retraction w/ extension 2x10 off edge + 10x off edge 2x10 off edge + 10 2x10 off edge 1x10 off edge to start     B/L ER w/ scap set YTB 2x10 YTB 2x10       Open books   1# 2x10 R/L 1# 2x10 R/L     [place] pulldowns   4 7# 2x10      [place] rows   4 7# 2x10               Ther Ex  16' 15' 25'     HEP updated        Wall slides  2x10, 5" hold 10x 5" hold 10x10" hold fwd     scap retract 2x10 Red tubing         B/L shld ext 2x10 Red tubing         Pt education  Reviewed symptoms, progression of mobility  Reviewed sx, improved mobility, cont concern     Posterior capsule stretch  R/L 2x10"  5x10"     Thoracic ext over blue med ball  2x10 2x10 2x10, inc L lev scap pain     B/L shld flx supine   2x10 2# DB      B/L chest press sup   2x10 2# DB      ROM/Re-assess    LS-10'                                Ther Activity                           Gait Training                           Modalities         Mechanical traction 20# x8', good tolerance 22# x 8', improved sx 22# x5' abolished sx 23# x8', improved sx                HEP:   Access Code: [email or web address]  URL: [email or web address]  Date: 03/14/2022  Updated: 03/21/2022  Prepared by: Puneet Cohen    Exercises  · Supine Cervical Retraction with Towel - 3 x daily - 7 x weekly - 1-2 sets - 10 reps  · Seated Correct Posture - 1 x daily - 7 x weekly - 3 sets - 10 reps  · Seated Thoracic Lumbar Extension - 2 x daily - 7 x weekly - 2 sets - 10 reps  · Standing shoulder flexion wall slides - 1 x daily - 7 x weekly - 1 sets - 5 reps - 20 second hold  · Standing Shoulder Posterior Capsule Stretch - 1 x daily - 7 x weekly - 1 sets - 5 reps - 20 second hold  · Doorway Rhomboid Stretch - 1 x daily - 7 x weekly - 1 sets - 5 reps - 20 second hold

## 2022-04-18 ENCOUNTER — OFFICE VISIT (OUTPATIENT)
Dept: PHYSICAL THERAPY | Facility: CLINIC | Age: 37
End: 2022-04-18
Payer: COMMERCIAL

## 2022-04-18 DIAGNOSIS — M54.2 NECK PAIN: ICD-10-CM

## 2022-04-18 DIAGNOSIS — M62.838 NECK MUSCLE SPASM: ICD-10-CM

## 2022-04-18 DIAGNOSIS — M54.12 CERVICAL RADICULOPATHY: Primary | ICD-10-CM

## 2022-04-18 PROCEDURE — 97112 NEUROMUSCULAR REEDUCATION: CPT

## 2022-04-18 NOTE — PROGRESS NOTES
Daily Note     Today's date: 2022  Patient name: Monster Abreu  : 1985  MRN: [id number]  Referring provider: Philly Alarcon MD  Dx:   Encounter Diagnosis     ICD-10-CM    1  Cervical radiculopathy  M54 12    2  Neck pain  M54 2    3  Neck muscle spasm  M62 838        Start Time: 0845  Stop Time: 920  Total time in clinic (min): 35 minutes    Subjective: Patient reports feeling really good today  Minimal pain until about 4:30 yesterday despite [employer], dinner with family and being out and about until around 6:30 pm  Patient reports she didn't need to take ibuprofen yesterday  Pleased with improvements made thus far  Objective: See treatment diary below      Assessment: Tolerated treatment well and without significant pain today  One twinge of pain with third set of open books using L UE, with discomfort resolving once she switched sides  Patient progressing well with functional activities  Patient demonstrated fatigue post treatment, exhibited good technique with therapeutic exercises and would benefit from continued PT      Plan: Continue per plan of care  Progress treatment as tolerated         Precautions: cervical   Daily Exercise Log:    Date 22    Visit # 7 8 9 10 (re-eval) 11             Manual         Manual C'spine traction  5'       IASTM UT/LevScap         subscap release         STM          PA mobs cervical/thoracic                           Neuro Re-Ed 13' 10' 20' 10' 26'    shld flx w/ back to wall W/ half roll 2x10 1#    W/ half roll 2x10 1# DB    Wall angels W/ half roll 2x10 1#    W/ half roll 2x10 1# DB    superman    5" hold 1x10 2x10    cerv retraction w/ extension 2x10 off edge + 10x off edge 2x10 off edge + 10 2x10 off edge 1x10 off edge to start     B/L ER w/ scap set YTB 2x10 YTB 2x10   RTB 2x10    Open books   1# 2x10 R/L 1# 2x10 R/L 1# 3x10 R/L    [place] pulldowns   4 7# 2x10  4 8# 2x10    [place] rows   4 7# 2x10  4 8# 2x10 Wall clocks     YTB 1x5                                Ther Ex  16' 15' 25' 5'    HEP updated        Wall slides  2x10, 5" hold 10x 5" hold 10x10" hold fwd 5x10" fwd    scap retract 2x10 Red tubing     Green TB 2x10    B/L shld ext 2x10 Red tubing         Pt education  Reviewed symptoms, progression of mobility  Reviewed sx, improved mobility, cont concern     Posterior capsule stretch  R/L 2x10"  5x10"     Thoracic ext over blue med ball  2x10 2x10 2x10, inc L lev scap pain Thoracic ext at wall 2x10    B/L shld flx supine   2x10 2# DB      B/L chest press sup   2x10 2# DB      ROM/Re-assess    LS-10'                                Ther Activity                           Gait Training                           Modalities         Mechanical traction 20# x8', good tolerance 22# x 8', improved sx 22# x5' abolished sx 23# x8', improved sx 23# x 5'                HEP:   Access Code: [email or web address]  URL: [email or web address]  Date: 03/14/2022  Updated: 03/21/2022  Prepared by: Preet Mora    Exercises  · Supine Cervical Retraction with Towel - 3 x daily - 7 x weekly - 1-2 sets - 10 reps  · Seated Correct Posture - 1 x daily - 7 x weekly - 3 sets - 10 reps  · Seated Thoracic Lumbar Extension - 2 x daily - 7 x weekly - 2 sets - 10 reps  · Standing shoulder flexion wall slides - 1 x daily - 7 x weekly - 1 sets - 5 reps - 20 second hold  · Standing Shoulder Posterior Capsule Stretch - 1 x daily - 7 x weekly - 1 sets - 5 reps - 20 second hold  · Doorway Rhomboid Stretch - 1 x daily - 7 x weekly - 1 sets - 5 reps - 20 second hold

## 2022-04-21 ENCOUNTER — OFFICE VISIT (OUTPATIENT)
Dept: PHYSICAL THERAPY | Facility: CLINIC | Age: 37
End: 2022-04-21
Payer: COMMERCIAL

## 2022-04-21 DIAGNOSIS — M54.12 CERVICAL RADICULOPATHY: Primary | ICD-10-CM

## 2022-04-21 DIAGNOSIS — M54.2 NECK PAIN: ICD-10-CM

## 2022-04-21 DIAGNOSIS — M62.838 NECK MUSCLE SPASM: ICD-10-CM

## 2022-04-21 PROCEDURE — 97112 NEUROMUSCULAR REEDUCATION: CPT

## 2022-04-21 PROCEDURE — 97110 THERAPEUTIC EXERCISES: CPT

## 2022-04-21 NOTE — PROGRESS NOTES
Daily Note     Today's date: 2022  Patient name: Sushant Fitzpatrick  : 1985  MRN: [id number]  Referring provider: Birgit Dougherty MD  Dx:   Encounter Diagnosis     ICD-10-CM    1  Cervical radiculopathy  M54 12    2  Neck pain  M54 2    3  Neck muscle spasm  M62 838        Start Time: 0800  Stop Time: 0845  Total time in clinic (min): 45 minutes    Subjective: Patient reporting increased pain in L side of neck today  Patient notes she didn't do her exercises much yesterday, with a busy day with school and getting ready for the family trip to [place]  Objective: See treatment diary below      Assessment: Tolerated treatment well and with improvement of neck symptoms  Progressing well with functional activities and pain levels  Patient demonstrated fatigue post treatment, exhibited good technique with therapeutic exercises and would benefit from continued PT      Plan: Continue per plan of care  Progress treatment as tolerated         Precautions: cervical   Daily Exercise Log:    Date 22   Visit # 7 8 9 10 (re-eval) 11 12            Manual         Manual C'spine traction  5'       IASTM UT/LevScap         subscap release         STM          PA mobs cervical/thoracic                           Neuro Re-Ed 13' 10' 20' 10' 26' 22'   shld flx w/ back to wall W/ half roll 2x10 1#    W/ half roll 2x10 1# DB W/ half roll 2x10 1# DB   Wall angels W/ half roll 2x10 1#    W/ half roll 2x10 1# DB W/ half roll 2x10 1# DB   superman    5" hold 1x10 2x10 2x10   cerv retraction w/ extension 2x10 off edge + 10x off edge 2x10 off edge + 10 2x10 off edge 1x10 off edge to start  2x10 to start off edge   B/L ER w/ scap set YTB 2x10 YTB 2x10   RTB 2x10    Open books   1# 2x10 R/L 1# 2x10 R/L 1# 3x10 R/L    [place] pulldowns   4 7# 2x10  4 8# 2x10 5 9# 2x10   [place] rows   4 7# 2x10  4 8# 2x10 5 9# 2x10   Wall clocks     YTB 1x5  YTB 1x5   Serratus roll      YTB Ther Ex  16' 15' 25' 5' 12'   HEP updated        Wall slides  2x10, 5" hold 10x 5" hold 10x10" hold fwd 5x10" fwd 5x10" fwd   scap retract 2x10 Red tubing     Green TB 2x10    B/L shld ext 2x10 Red tubing         Pt education  Reviewed symptoms, progression of mobility  Reviewed sx, improved mobility, cont concern     Posterior capsule stretch  R/L 2x10"  5x10"     Thoracic ext over blue med ball  2x10 2x10 2x10, inc L lev scap pain Thoracic ext at wall 2x10 Thoracic ext at wall 2x10   B/L shld flx supine   2x10 2# DB   2x10 2# DB on blue ball   B/L chest press sup   2x10 2# DB   2x10 2# DB on blue ball   ROM/Re-assess    LS-10'                                Ther Activity                           Gait Training                           Modalities         Mechanical traction 20# x8', good tolerance 22# x 8', improved sx 22# x5' abolished sx 23# x8', improved sx 23# x 5'  24# x 6'              HEP:   Access Code: [email or web address]  URL: [email or web address]  Date: 03/14/2022  Updated: 03/21/2022  Prepared by: Zeenat Artis    Exercises  · Supine Cervical Retraction with Towel - 3 x daily - 7 x weekly - 1-2 sets - 10 reps  · Seated Correct Posture - 1 x daily - 7 x weekly - 3 sets - 10 reps  · Seated Thoracic Lumbar Extension - 2 x daily - 7 x weekly - 2 sets - 10 reps  · Standing shoulder flexion wall slides - 1 x daily - 7 x weekly - 1 sets - 5 reps - 20 second hold  · Standing Shoulder Posterior Capsule Stretch - 1 x daily - 7 x weekly - 1 sets - 5 reps - 20 second hold  · Doorway Rhomboid Stretch - 1 x daily - 7 x weekly - 1 sets - 5 reps - 20 second hold Principal Discharge DX:	Near syncope

## 2022-04-25 ENCOUNTER — APPOINTMENT (OUTPATIENT)
Dept: PHYSICAL THERAPY | Facility: CLINIC | Age: 37
End: 2022-04-25
Payer: COMMERCIAL

## 2022-04-28 ENCOUNTER — APPOINTMENT (OUTPATIENT)
Dept: PHYSICAL THERAPY | Facility: CLINIC | Age: 37
End: 2022-04-28
Payer: COMMERCIAL

## 2022-05-02 ENCOUNTER — TELEPHONE (OUTPATIENT)
Dept: PHYSICAL THERAPY | Facility: CLINIC | Age: 37
End: 2022-05-02

## 2022-05-02 ENCOUNTER — APPOINTMENT (OUTPATIENT)
Dept: PHYSICAL THERAPY | Facility: CLINIC | Age: 37
End: 2022-05-02
Payer: COMMERCIAL

## 2022-05-02 NOTE — TELEPHONE ENCOUNTER
Patient called to cancel her appointment for today due to child being home sick from school  She confirmed for her next appointment on 5/5/2022

## 2022-05-05 ENCOUNTER — OFFICE VISIT (OUTPATIENT)
Dept: PHYSICAL THERAPY | Facility: CLINIC | Age: 37
End: 2022-05-05
Payer: COMMERCIAL

## 2022-05-05 DIAGNOSIS — M62.838 NECK MUSCLE SPASM: ICD-10-CM

## 2022-05-05 DIAGNOSIS — M54.2 NECK PAIN: ICD-10-CM

## 2022-05-05 DIAGNOSIS — M54.12 CERVICAL RADICULOPATHY: Primary | ICD-10-CM

## 2022-05-05 PROCEDURE — 97110 THERAPEUTIC EXERCISES: CPT

## 2022-05-05 PROCEDURE — 97112 NEUROMUSCULAR REEDUCATION: CPT

## 2022-05-05 NOTE — PROGRESS NOTES
Daily Note     Today's date: 2022  Patient name: Marge Salgado  : 1985  MRN: [id number]  Referring provider: Timmy Ramirez MD  Dx:   Encounter Diagnosis     ICD-10-CM    1  Cervical radiculopathy  M54 12    2  Neck pain  M54 2    3  Neck muscle spasm  M62 838        Start Time: 0935  Stop Time: 1010  Total time in clinic (min): 35 minutes    Subjective: Patient reports doing well with her exercises and managing symptoms while away in [place], with increased pain/soreness noted yesterday after a busy week catching up at home  Pain in L levator scap/upper trap today  Objective: See treatment diary below      Assessment: Tolerated treatment well and with decreased symptoms  Patient strongly considering getting a home traction unit to assist with maintaining her improvements, as this provides significant relief  Patient demonstrated fatigue post treatment, exhibited good technique with therapeutic exercises and would benefit from continued PT  Plan: Continue per plan of care  Progress treatment as tolerated         Precautions: cervical   Daily Exercise Log:    Date 22   Visit # 13     12            Manual         Manual C'spine traction         IASTM UT/LevScap         subscap release         STM          PA mobs cervical/thoracic                           Neuro Re-Ed 10'     22'   shld flx w/ back to wall Foam roll 2# 2x10     W/ half roll 2x10 1# DB   Wall angels Foam roll 1# 2x10     W/ half roll 2x10 1# DB   superman      2x10   cerv retraction w/ extension      2x10 to start off edge   B/L ER w/ scap set 2x10 RTB        Open books         [place] pulldowns      5 9# 2x10   [place] rows      5 9# 2x10   Wall clocks YTB 2x10     YTB 1x5   Serratus roll      YTB                      Ther Ex 15'     12'   HEP         Wall slides 10x10" fwd     5x10" fwd   scap retract         B/L shld ext         Pt education         Posterior capsule stretch 5x15" hold        Thoracic ext over blue med ball      Thoracic ext at wall 2x10   B/L shld flx supine      2x10 2# DB on blue ball   B/L chest press sup      2x10 2# DB on blue ball   ROM/Re-assess         horiz abd TB 2x10 RTB         Wall push-ups 1x10                 Ther Activity                           Gait Training                           Modalities         Mechanical traction 10' 20-24#     24# x 6'              HEP:   Access Code: [email or web address]  URL: [email or web address]  Date: 03/14/2022  Updated: 03/21/2022  Prepared by: Yuki Head    Exercises  · Supine Cervical Retraction with Towel - 3 x daily - 7 x weekly - 1-2 sets - 10 reps  · Seated Correct Posture - 1 x daily - 7 x weekly - 3 sets - 10 reps  · Seated Thoracic Lumbar Extension - 2 x daily - 7 x weekly - 2 sets - 10 reps  · Standing shoulder flexion wall slides - 1 x daily - 7 x weekly - 1 sets - 5 reps - 20 second hold  · Standing Shoulder Posterior Capsule Stretch - 1 x daily - 7 x weekly - 1 sets - 5 reps - 20 second hold  · Doorway Rhomboid Stretch - 1 x daily - 7 x weekly - 1 sets - 5 reps - 20 second hold

## 2022-05-09 ENCOUNTER — OFFICE VISIT (OUTPATIENT)
Dept: PHYSICAL THERAPY | Facility: CLINIC | Age: 37
End: 2022-05-09
Payer: COMMERCIAL

## 2022-05-09 DIAGNOSIS — M54.12 CERVICAL RADICULOPATHY: Primary | ICD-10-CM

## 2022-05-09 DIAGNOSIS — M62.838 NECK MUSCLE SPASM: ICD-10-CM

## 2022-05-09 DIAGNOSIS — M54.2 NECK PAIN: ICD-10-CM

## 2022-05-09 PROCEDURE — 97112 NEUROMUSCULAR REEDUCATION: CPT

## 2022-05-09 NOTE — PROGRESS NOTES
Daily Note     Today's date: 2022  Patient name: Nic Mackenzie  : 1985  MRN: [id number]  Referring provider: Mary Garner MD  Dx:   Encounter Diagnosis     ICD-10-CM    1  Cervical radiculopathy  M54 12    2  Neck pain  M54 2    3  Neck muscle spasm  M62 838        Start Time: 0935  Stop Time: 1015  Total time in clinic (min): 40 minutes    Subjective: Patient reporting feeling pretty good over the weekend  Improving consistency of ability to manage pain noted  Objective: See treatment diary below      Assessment: Tolerated treatment well and with relief of pain at end of session  Progressing well with strengthening and stabilization activities, with good tolerance for progression of activities  Patient demonstrated fatigue post treatment, exhibited good technique with therapeutic exercises and would benefit from continued PT      Plan: Continue per plan of care  Progress treatment as tolerated         Precautions: cervical   Daily Exercise Log:    Date 22       Visit # 13 14                Manual         IASTM UT/LevScap         subscap release  3' LS       STM                            Neuro Re-Ed 10' 25'       shld flx w/ back to wall Foam roll 2# 2x10 Foam roll 2# 2x10       Wall angels Foam roll 1# 2x10 Foam roll 1# 2x10       superman         cerv retraction w/ extension  2x10       B/L ER w/ scap set 2x10 RTB 2x10 RTB       Open books         [place] pulldowns  6 5# 2x10       [place] rows  6 5# 2x10       Wall clocks YTB 2x10 YTB 1x10       Serratus roll  YTB 1x10                         Ther Ex 15' 5'       HEP         Wall slides 10x10" fwd 10x10" fwd       scap retract         B/L shld ext         Pt education         Posterior capsule stretch 5x15" hold        Thoracic ext at wall         B/L shld flx supine         B/L chest press sup         ROM/Re-assess         horiz abd TB 2x10 RTB  RTB 2x10       Wall push-ups 1x10 2x10                Ther Activity Gait Training                           Modalities         Mechanical traction 10' 20-24# 8' 20-24#                  HEP:   Access Code: [email or web address]  URL: [email or web address]  Date: 03/14/2022  Updated: 03/21/2022  Prepared by: iLnda Mcnamara    Exercises  · Supine Cervical Retraction with Towel - 3 x daily - 7 x weekly - 1-2 sets - 10 reps  · Seated Correct Posture - 1 x daily - 7 x weekly - 3 sets - 10 reps  · Seated Thoracic Lumbar Extension - 2 x daily - 7 x weekly - 2 sets - 10 reps  · Standing shoulder flexion wall slides - 1 x daily - 7 x weekly - 1 sets - 5 reps - 20 second hold  · Standing Shoulder Posterior Capsule Stretch - 1 x daily - 7 x weekly - 1 sets - 5 reps - 20 second hold  · Doorway Rhomboid Stretch - 1 x daily - 7 x weekly - 1 sets - 5 reps - 20 second hold

## 2022-05-12 ENCOUNTER — EVALUATION (OUTPATIENT)
Dept: PHYSICAL THERAPY | Facility: CLINIC | Age: 37
End: 2022-05-12
Payer: COMMERCIAL

## 2022-05-12 DIAGNOSIS — M54.12 CERVICAL RADICULOPATHY: Primary | ICD-10-CM

## 2022-05-12 DIAGNOSIS — M54.2 NECK PAIN: ICD-10-CM

## 2022-05-12 DIAGNOSIS — M62.838 NECK MUSCLE SPASM: ICD-10-CM

## 2022-05-12 PROCEDURE — 97110 THERAPEUTIC EXERCISES: CPT

## 2022-05-12 NOTE — PROGRESS NOTES
PT Re-Evaluation     Today's date: 2022  Patient name: Carmen Rae  : 1985  MRN: [id number]  Referring provider: Anjali Cabrera MD  Dx:   Encounter Diagnosis     ICD-10-CM    1  Cervical radiculopathy  M54 12    2  Neck pain  M54 2    3  Neck muscle spasm  M62 838        Start Time: 9035  Stop Time: 1267  Total time in clinic (min): 35 minutes    Assessment  Assessment details: 22: Patient demonstrating significant improvements in all areas, with patient progressing towards discharge at this time  Patient able to manage symptoms and with addition of home traction unit use, anticipate patient able to abolish pain independently as needed  Patient will benefit from two additional sessions to ensure ability to abolish without therapist assistance while performing daily demands  Excellent progress made thus far  22: Patient noted to have significantly increased mobility, tolerance to sitting and standing and ability to perform household tasks  Decreased need for rest breaks, with continued increased pain at the end of the day and difficulty lifting/reaching overhead  Patient notes gradually improving overall mobility, with continued decreased scapular stabilization demonstrated  Patient requires continued skilled PT to address UE weakness, scapular stability and to abolish cervical pain and improve ability for patient to resolve pain without therapist assistance  Patient will benefit from an additional 4 weeks of physical therapy to return to prior level of function and abolish pain  Progressing well with all activities and exercises  3/14/22: Carmen Rae is a 43 y o  female who presents with cervical derangement with pain, decreased strength, decreased ROM and postural  dysfunction  Due to these impairments, patient has difficulty performing ADL's, recreational activities, lifting/carrying, reaching   Patient noted to have difficulty completing all activities she needs to do in a day, due to increased pain and weakness in the left side of her neck and into her shoulder  Patient's clinical presentation is consistent with their referring diagnosis of Cervical radiculopathy  Patient has been educated in home exercise program and plan of care  Patient would benefit from skilled physical therapy services to address their aforementioned functional limitations and progress towards prior level of function and independence with home exercise program and self symptom management/resolution  Impairments: pain with function and scapular dyskinesis  Functional limitations: improving ability with overhead reaching and overhead tasks  Understanding of Dx/Px/POC: good   Prognosis: good    Goals  Short Term Goals:  4 weeks Target Date 4/11/22  1  Initiate and advance HEP toward self symptom reduction/resolution  MET  2  Improve postural awareness and demonstrate self postural correction  MET  3  Improve AROM cervical spine to min cordero or better t/o  MET  4  Undisturbed sleep  MET  5  Pt to tolerate prolonged driving x 1 hour or more w/o c/o  MET      Long Term Goals:  12 weeks Target Date 6/6/22   1  Indep with HEP and self symptom management/prevention  Updated again today, progressing well  2  Achieve cervical AROM to WNL w/o c/o  MET  3  Demonstrate ability to lift 15# OH without increased pain  Progressing  4  Pt will report improved tolerance for sitting with her long days, using improved posture and with minimal pain reported  MET  5  Patient will demonstrate consistent use of improved posture in clinic with carryover reported at home   MET      Plan  Patient would benefit from: skilled PT  Planned therapy interventions: joint mobilization, manual therapy, patient education, postural training, body mechanics training, home exercise program, neuromuscular re-education, strengthening, stretching, therapeutic activities and therapeutic exercise  Other planned therapy interventions: mechanical assessment  Frequency: 2x week  Duration in weeks: 12  Plan of Care beginning date: 3/14/2022  Plan of Care expiration date: 2022  Treatment plan discussed with: patient        Subjective Evaluation    History of Present Illness  Onset date: 1 month prior to start of care, recent bout with L side of neck  Mechanism of injury: Patient reports insidious onset of neck pain about 1 month prior to start of care  Has a remote history of neck pain with radiating symptoms down her right arm in 2019, for which she received PT at another clinic, and resolved extremity symptoms, but was unable to resolve central neck pain at that time, which resolved over time  Has had neck pain intermittently since then, but now with significant worsening of symptoms in the past month, without resolution or improvement             Recurrent probem    Quality of life: good (Improving since start of care)    Pain  Current pain ratin (left side cervical spine)  At best pain ratin (always a little something)  At worst pain ratin  Location: L side of neck  Quality: burning, radiating, sharp and tight (Headaches)  Relieving factors: ice and relaxation  Aggravating factors: overhead activity, lifting and sitting  Progression: improved    Social Support  Steps to enter house: yes  13  Stairs in house: yes   13  Lives in: multiple-level home  Lives with: young children and spouse    Employment status: working (Part time as psychologist, 2 full days)  Hand dominance: left  Exercise history: walking, housework, playing with her children, stretching, pilates      Diagnostic Tests  MRI studies: abnormal  Treatments  Previous treatment: medication and physical therapy (PT for R side neck pain 2019 for several months)  Current treatment: physical therapy  Patient Goals  Patient goals for therapy: decreased pain, independence with ADLs/IADLs, return to sport/leisure activities and increased strength  Patient goal: To be able to have periods of no pain  Improve ability to abolish pain on my own  Objective   *=painful  Cervical spine:   ROM: updated 22  Flexion: nil limitation  Extension: nil limitation   Sidebend L: nil limitation  R: nil limitation  Rotation L: nil limitation R: nil limitation    Repeated movement testin22: Patient noted to be nearly independent in use of retraction/extension in supine to abolish pain  Recently ordered and received home traction unit as well, which she has not yet begun to use  22: Patient has been taken through significant progression of cervical retraction and extension progression with + abolishment of pain with supine retraction extension off edge of mat or bed  Improving consistency of performance at home and in work setting, with decreasing pain noted  Also utilizing thoracic extension over chair to decrease shoulder/scapular pain  Improving ability to manage pain without therapist assistance, with continued need for traction to fully abolish pain in L side of neck  3/14/22: Repeated cervical retraction in sitting: 2x10 no significant change in pain but felt "good"  Repeated cervical retraction in supine with towel: x10 mild inc mobility, no sig change in pain but again felt "good"    Palpation:   22: Pt noted tightness, but no specific areas of increased tightness as she had previously  22: Patient noted to have one area of tightness in L side of neck along scalenes, and at times, in L levator scapula  3/14/22Patient noted to have significant tightness and pain with palpation over cervical and upper trap mm, especially on left side  Patient reported + response to deep pressure in these muscles  Posture:   22: Good awareness of postural improvements, with increasing self-correction continuing to be demonstrated  22: Pt noted to have significantly improved posture, with upright posture noted in waiting area and when in clinic   Significant carryover of posture and stretching which is resulting in significant improvement in pain  3/14/22: Patient noted to have decreased lumbar lordosis, increased thoracic kyphosis, forward head and rounded shoulders upon PT evaluation  Able to correct posture with cues, and increased ease with use of towel roll for lumbar support  Education provided on posture, cervical positioning, and improving positioning to decrease cervical strain  UE strength:   5/12/22: L and R 4+/5 without pain  Improved scapular stabilization  Previously: L shoulder grossly 4-/5, R shoulder 4/5 with continued L scapular winging noted         Precautions: cervical   Daily Exercise Log:    Date 5/5/22 5/9/22 5/12/22      Visit # 13 14 15 (RE/FOTO)               Manual         IASTM UT/LevScap         subscap release  3' LS       STM                            Neuro Re-Ed 10' 25' 35'      shld flx w/ back to wall Foam roll 2# 2x10 Foam roll 2# 2x10       Wall angels Foam roll 1# 2x10 Foam roll 1# 2x10       superman         cerv retraction w/ extension  2x10 Off edge of mat, 2x10      B/L ER w/ scap set 2x10 RTB 2x10 RTB       Open books         [place] pulldowns  6 5# 2x10       [place] rows  6 5# 2x10       Wall clocks YTB 2x10 YTB 1x10 YTB 1x10      Serratus roll  YTB 1x10 YTB 1x10                        Ther Ex 15' 5'       HEP   Updated & reviewed, discussed plan for progression to d/c      Wall slides 10x10" fwd 10x10" fwd 10"x5      scap retract   Green tubing 2x15      B/L shld ext   Green tubing 2x15      Pt education         Posterior capsule stretch 5x15" hold        ROM/Re-assess   LS      horiz abd TB 2x10 RTB  RTB 2x10 RTB 2x10, and diagonals 2x10 R/L      Wall push-ups 1x10 2x10 2x10               Ther Activity                           Gait Training                           Modalities         Mechanical traction 10' 20-24# 8' 20-24#                  HEP:   Access Code: [email or web address]  URL: [email or web address]  Date: 03/14/2022  Updated: 03/21/2022  Updated: 05/12/2022  Prepared by: Yuki Head    Exercises  · Standing shoulder flexion wall slides - 1 x daily - 7 x weekly - 1 sets - 5 reps - 20 second hold  · Standing Shoulder Posterior Capsule Stretch - 1 x daily - 7 x weekly - 1 sets - 5 reps - 20 second hold  · Doorway Rhomboid Stretch - 1 x daily - 7 x weekly - 1 sets - 5 reps - 20 second hold  · Standing Shoulder Diagonal Horizontal Abduction 60/120 Degrees with Resistance - 1 x daily - 3 x weekly - 2 sets - 10 reps  · Standing High Row with Resistance - 1 x daily - 3 x weekly - 2 sets - 10 reps  · Standing Row with Anchored Resistance - 1 x daily - 3 x weekly - 2 sets - 10 reps  · Shoulder extension with resistance - Neutral - 1 x daily - 3 x weekly - 2 sets - 10 reps  · Shoulder External Rotation and Scapular Retraction with Resistance - 1 x daily - 3 x weekly - 2 sets - 10 reps  · Standing Overhead Press with Dumbbells at [place] - 1 x daily - 3 x weekly - 2 sets - 10 reps  · Scaption with Dumbbells - 1 x daily - 3 x weekly - 2 sets - 10 reps  · Wall Clock with Theraband - 1 x daily - 3 x weekly - 1-2 sets - 10 reps  · Shoulder Flexion Serratus Activation with Resistance - 1 x daily - 3 x weekly - 1-2 sets - 10 reps  · Standing shoulder full range flexion - 1 x daily - 3 x weekly - 2 sets - 10 reps

## 2022-05-17 ENCOUNTER — APPOINTMENT (OUTPATIENT)
Dept: PHYSICAL THERAPY | Facility: CLINIC | Age: 37
End: 2022-05-17
Payer: COMMERCIAL

## 2022-05-19 ENCOUNTER — OFFICE VISIT (OUTPATIENT)
Dept: PHYSICAL THERAPY | Facility: CLINIC | Age: 37
End: 2022-05-19
Payer: COMMERCIAL

## 2022-05-19 DIAGNOSIS — M54.12 CERVICAL RADICULOPATHY: Primary | ICD-10-CM

## 2022-05-19 DIAGNOSIS — M62.838 NECK MUSCLE SPASM: ICD-10-CM

## 2022-05-19 DIAGNOSIS — M54.2 NECK PAIN: ICD-10-CM

## 2022-05-19 PROCEDURE — 97112 NEUROMUSCULAR REEDUCATION: CPT

## 2022-05-19 PROCEDURE — 97110 THERAPEUTIC EXERCISES: CPT

## 2022-05-19 NOTE — PROGRESS NOTES
Daily Note     Today's date: 2022  Patient name: Kendrick Hanson  : 1985  MRN: [id number]  Referring provider: Dyanne Claude, MD  Dx:   Encounter Diagnosis     ICD-10-CM    1  Cervical radiculopathy  M54 12    2  Neck pain  M54 2    3  Neck muscle spasm  M62 838        Start Time: 9844  Stop Time: 0925  Total time in clinic (min): 33 minutes    Subjective: Patient reports feeling better, with significantly improved ability to manage symptoms  Had some soreness after playing piano both Saturday and , with patient able to manage symptoms and resolved by Monday morning  Objective: See treatment diary below      Assessment: Tolerated treatment well and without increased pain  Progressing well with all functional activities, with improving mobility and strength noted overall  Patient will benefit from one additional session to finalize HEP and review any questions had  Preparing well for discharge  Patient demonstrated fatigue post treatment, exhibited good technique with therapeutic exercises and would benefit from continued PT  Plan: Continue per plan of care  Potential discharge next visit       Precautions: cervical   Daily Exercise Log:    Date 22     Visit # 13 14 15 (RE/FOTO) 16              Manual         IASTM UT/LevScap         subscap release  3' LS       STM                            Neuro Re-Ed 10' 25' 35' 23'     shld flx w/ back to wall Foam roll 2# 2x10 Foam roll 2# 2x10  Foam roll 2# 2x10     Wall angels Foam roll 1# 2x10 Foam roll 1# 2x10  Foam roll 2# 2x10     superman    2x10     cerv retraction w/ extension  2x10 Off edge of mat, 2x10      B/L ER w/ scap set 2x10 RTB 2x10 RTB  2x10 RTB     Open books         [place] pulldowns  6 5# 2x10  7 0# 2x10     [place] rows  6 5# 2x10  7 0# 2x10     Wall clocks YTB 2x10 YTB 1x10 YTB 1x10 RTB 1x10     Serratus roll  YTB 1x10 YTB 1x10 RTB 1x10                       Ther Ex 15' 5'  8'     HEP Updated & reviewed, discussed plan for progression to d/c      Wall slides 10x10" fwd 10x10" fwd 10"x5 10x10" hold fwd     scap retract   Green tubing 2x15      B/L shld ext   Green tubing 2x15      Pt education    Reviewed plan for d/c to HEP next week     Posterior capsule stretch 5x15" hold   3x15" R/L     ROM/Re-assess   LS      horiz abd TB 2x10 RTB  RTB 2x10 RTB 2x10, and diagonals 2x10 R/L RTB 2x10 and diagonals R/L 2x10     Wall push-ups 1x10 2x10 2x10               Ther Activity                           Gait Training                           Modalities         Mechanical traction 10' 20-24# 8' 20-24#                  HEP:   Access Code: [email or web address]  URL: [email or web address]  Date: 03/14/2022  Updated: 03/21/2022  Updated: 05/12/2022  Prepared by: Adri Shrestha    Exercises  · Standing shoulder flexion wall slides - 1 x daily - 7 x weekly - 1 sets - 5 reps - 20 second hold  · Standing Shoulder Posterior Capsule Stretch - 1 x daily - 7 x weekly - 1 sets - 5 reps - 20 second hold  · Doorway Rhomboid Stretch - 1 x daily - 7 x weekly - 1 sets - 5 reps - 20 second hold  · Standing Shoulder Diagonal Horizontal Abduction 60/120 Degrees with Resistance - 1 x daily - 3 x weekly - 2 sets - 10 reps  · Standing High Row with Resistance - 1 x daily - 3 x weekly - 2 sets - 10 reps  · Standing Row with Anchored Resistance - 1 x daily - 3 x weekly - 2 sets - 10 reps  · Shoulder extension with resistance - Neutral - 1 x daily - 3 x weekly - 2 sets - 10 reps  · Shoulder External Rotation and Scapular Retraction with Resistance - 1 x daily - 3 x weekly - 2 sets - 10 reps  · Standing Overhead Press with Dumbbells at [address] - 1 x daily - 3 x weekly - 2 sets - 10 reps  · Scaption with Dumbbells - 1 x daily - 3 x weekly - 2 sets - 10 reps  · Wall Clock with Theraband - 1 x daily - 3 x weekly - 1-2 sets - 10 reps  · Shoulder Flexion Serratus Activation with Resistance - 1 x daily - 3 x weekly - 1-2 sets - 10 reps  · Standing shoulder full range flexion - 1 x daily - 3 x weekly - 2 sets - 10 reps

## 2022-05-24 ENCOUNTER — APPOINTMENT (OUTPATIENT)
Dept: PHYSICAL THERAPY | Facility: CLINIC | Age: 37
End: 2022-05-24
Payer: COMMERCIAL

## 2022-05-26 ENCOUNTER — OFFICE VISIT (OUTPATIENT)
Dept: PHYSICAL THERAPY | Facility: CLINIC | Age: 37
End: 2022-05-26
Payer: COMMERCIAL

## 2022-05-26 DIAGNOSIS — M54.12 CERVICAL RADICULOPATHY: Primary | ICD-10-CM

## 2022-05-26 DIAGNOSIS — M54.2 NECK PAIN: ICD-10-CM

## 2022-05-26 DIAGNOSIS — M62.838 NECK MUSCLE SPASM: ICD-10-CM

## 2022-05-26 PROCEDURE — 97112 NEUROMUSCULAR REEDUCATION: CPT

## 2022-05-26 PROCEDURE — 97110 THERAPEUTIC EXERCISES: CPT

## 2022-05-26 NOTE — PROGRESS NOTES
PT Re-Evaluation     Today's date: 2022  Patient name: Nic Mackenzie  : 1985  MRN: [id number]  Referring provider: Mary Garner MD  Dx:   Encounter Diagnosis     ICD-10-CM    1  Cervical radiculopathy  M54 12    2  Neck pain  M54 2    3  Neck muscle spasm  M62 838        Start Time: 0800  Stop Time: 2839  Total time in clinic (min): 35 minutes    Assessment  Assessment details: 22: Patient has demonstrated excellent progress in all areas with pt returning to most functional activities, only limited in heavier lifting, especially overhead  Good understanding of symptom management, with patient having a home traction unit now as well  Independent with HEP, with patient discharged at this time, all goals achieved  22: Patient demonstrating significant improvements in all areas, with patient progressing towards discharge at this time  Patient able to manage symptoms and with addition of home traction unit use, anticipate patient able to abolish pain independently as needed  Patient will benefit from two additional sessions to ensure ability to abolish without therapist assistance while performing daily demands  Excellent progress made thus far  22: Patient noted to have significantly increased mobility, tolerance to sitting and standing and ability to perform household tasks  Decreased need for rest breaks, with continued increased pain at the end of the day and difficulty lifting/reaching overhead  Patient notes gradually improving overall mobility, with continued decreased scapular stabilization demonstrated  Patient requires continued skilled PT to address UE weakness, scapular stability and to abolish cervical pain and improve ability for patient to resolve pain without therapist assistance  Patient will benefit from an additional 4 weeks of physical therapy to return to prior level of function and abolish pain  Progressing well with all activities and exercises      3/14/22: Marlena Mckeon is a 43 y o  female who presents with cervical derangement with pain, decreased strength, decreased ROM and postural  dysfunction  Due to these impairments, patient has difficulty performing ADL's, recreational activities, lifting/carrying, reaching  Patient noted to have difficulty completing all activities she needs to do in a day, due to increased pain and weakness in the left side of her neck and into her shoulder  Patient's clinical presentation is consistent with their referring diagnosis of Cervical radiculopathy  Patient has been educated in home exercise program and plan of care  Patient would benefit from skilled physical therapy services to address their aforementioned functional limitations and progress towards prior level of function and independence with home exercise program and self symptom management/resolution  Functional limitations: Mild limitation with heavier lifting, especially overhead  Understanding of Dx/Px/POC: good   Prognosis: good    Goals  Short Term Goals:  4 weeks Target Date 4/11/22  1  Initiate and advance HEP toward self symptom reduction/resolution  MET  2  Improve postural awareness and demonstrate self postural correction  MET  3  Improve AROM cervical spine to min cordero or better t/o  MET  4  Undisturbed sleep  MET  5  Pt to tolerate prolonged driving x 1 hour or more w/o c/o  MET      Long Term Goals:  12 weeks Target Date 6/6/22   1  Indep with HEP and self symptom management/prevention  MET  2  Achieve cervical AROM to WNL w/o c/o  MET  3  Demonstrate ability to lift 15# OH without increased pain  MET  4  Pt will report improved tolerance for sitting with her long days, using improved posture and with minimal pain reported  MET  5  Patient will demonstrate consistent use of improved posture in clinic with carryover reported at home   MET      Plan  Plan details: Discharge to [facility] today  Planned therapy interventions: home exercise program  Frequency: 2x week  Duration in weeks: 12  Plan of Care beginning date: 3/14/2022  Plan of Care expiration date: 2022  Treatment plan discussed with: patient        Subjective Evaluation    History of Present Illness  Onset date: 1 month prior to start of care, recent bout with L side of neck  Mechanism of injury: Patient reports insidious onset of neck pain about 1 month prior to start of care  Has a remote history of neck pain with radiating symptoms down her right arm in 2019, for which she received PT at another clinic, and resolved extremity symptoms, but was unable to resolve central neck pain at that time, which resolved over time  Has had neck pain intermittently since then, but now with significant worsening of symptoms in the past month, without resolution or improvement  Recurrent probem    Quality of life: good (Improving since start of care)    Pain  Current pain ratin  At best pain ratin  At worst pain rating: 3  Location: neck/shoulder  Quality: Headaches  Aggravating factors: overhead activity, lifting and sitting  Progression: improved    Social Support  Steps to enter house: yes  13  Stairs in house: yes   13  Lives in: multiple-level home  Lives with: young children and spouse    Employment status: working (Part time as psychologist, 2 full days)  Hand dominance: left  Exercise history: walking, housework, playing with her children, stretching, pilates      Diagnostic Tests  MRI studies: abnormal  Treatments  Previous treatment: medication and physical therapy (PT for R side neck pain 2019 for several months)  Current treatment: physical therapy  Patient Goals  Patient goals for therapy: decreased pain, independence with ADLs/IADLs, return to sport/leisure activities and increased strength  Patient goal: To be able to have periods of no pain  Improve ability to abolish pain on my own          Objective   *=painful  Cervical spine:   ROM:   Flexion: nil limitation  Extension: nil limitation   Sidebend L: nil limitation  R: nil limitation  Rotation L: nil limitation R: nil limitation    Repeated movement testin22: Patient noted to be nearly independent in use of retraction/extension in supine to abolish pain  Recently ordered and received home traction unit as well, which she has not yet begun to use  22: Patient has been taken through significant progression of cervical retraction and extension progression with + abolishment of pain with supine retraction extension off edge of mat or bed  Improving consistency of performance at home and in work setting, with decreasing pain noted  Also utilizing thoracic extension over chair to decrease shoulder/scapular pain  Improving ability to manage pain without therapist assistance, with continued need for traction to fully abolish pain in L side of neck  3/14/22: Repeated cervical retraction in sitting: 2x10 no significant change in pain but felt "good"  Repeated cervical retraction in supine with towel: x10 mild inc mobility, no sig change in pain but again felt "good"    Palpation:   22: Pt noted tightness, but no specific areas of increased tightness as she had previously  22: Patient noted to have one area of tightness in L side of neck along scalenes, and at times, in L levator scapula  3/14/22Patient noted to have significant tightness and pain with palpation over cervical and upper trap mm, especially on left side  Patient reported + response to deep pressure in these muscles  Posture:   22: Improved stability of spine noted with overhead activities during session today  Good awareness of posture  22: Good awareness of postural improvements, with increasing self-correction continuing to be demonstrated  22: Pt noted to have significantly improved posture, with upright posture noted in waiting area and when in clinic   Significant carryover of posture and stretching which is resulting in significant improvement in pain  3/14/22: Patient noted to have decreased lumbar lordosis, increased thoracic kyphosis, forward head and rounded shoulders upon PT evaluation  Able to correct posture with cues, and increased ease with use of towel roll for lumbar support  Education provided on posture, cervical positioning, and improving positioning to decrease cervical strain  UE strength:   5/26/22: L and R 4+/5 without pain  Improved scapular stabilization    Previously: L shoulder grossly 4-/5, R shoulder 4/5 with continued L scapular winging noted         Precautions: cervical   Daily Exercise Log:    Date 5/5/22 5/9/22 5/12/22 5/19/22 5/26/22    Visit # 13 14 15 (RE/FOTO) 16 17             Manual         IASTM UT/LevScap         subscap release  3' LS       [facility]                            Neuro Re-Ed 10' 25' 35' 23' 20'    shld flx w/ back to wall Foam roll 2# 2x10 Foam roll 2# 2x10  Foam roll 2# 2x10 Foam roll 2# 2x10    Wall angels Foam roll 1# 2x10 Foam roll 1# 2x10  Foam roll 2# 2x10 Foam roll 2# 2x10    superman    2x10     cerv retraction w/ extension  2x10 Off edge of mat, 2x10      B/L ER w/ scap set 2x10 RTB 2x10 RTB  2x10 RTB 2x10 RTB    Open books         Cadiz OH pulldowns  6 5# 2x10  7 0# 2x10 7 8# 2x10    Cadiz OH rows  6 5# 2x10  7 0# 2x10 7 8# 2x10    Wall clocks YTB 2x10 YTB 1x10 YTB 1x10 RTB 1x10 RTB 1x10    Serratus roll  YTB 1x10 YTB 1x10 RTB 1x10 RTB 1x10                      Ther Ex 15' 5'  8' 10'    HEP   Updated & reviewed, discussed plan for progression to d/c  Reviewed, good understanding expressed    Wall slides 10x10" fwd 10x10" fwd 10"x5 10x10" hold fwd 10x10" hold fwd    scap retract   Green tubing 2x15      B/L shld ext   Green tubing 2x15      Pt education    Reviewed plan for d/c to HEP next week     Posterior capsule stretch 5x15" hold   3x15" R/L     ROM/Re-assess   LS      horiz abd TB 2x10 RTB  RTB 2x10 RTB 2x10, and diagonals 2x10 R/L RTB 2x10 and diagonals R/L 2x10 RTB 2x10 and diagonals R/L 1x10    Wall push-ups 1x10 2x10 2x10  2x10             Ther Activity                           Gait Training                           Modalities         Mechanical traction 10' 20-24# 8' 20-24#                  HEP:   Access Code: [email or web address]  URL: [email or web address]  Date: 03/14/2022  Updated: 03/21/2022  Updated: 05/12/2022  Updated: 05/26/2022  Prepared by: Leonardo Pop    Exercises  · Standing shoulder flexion wall slides - 1 x daily - 7 x weekly - 1 sets - 5 reps - 20 second hold  · Standing Shoulder Posterior Capsule Stretch - 1 x daily - 7 x weekly - 1 sets - 5 reps - 20 second hold  · Doorway Rhomboid Stretch - 1 x daily - 7 x weekly - 1 sets - 5 reps - 20 second hold  · Standing Shoulder Diagonal Horizontal Abduction 60/120 Degrees with Resistance - 1 x daily - 3 x weekly - 2 sets - 10 reps  · Standing High Row with Resistance - 1 x daily - 3 x weekly - 2 sets - 10 reps  · Standing Row with Anchored Resistance - 1 x daily - 3 x weekly - 2 sets - 10 reps  · Shoulder extension with resistance - Neutral - 1 x daily - 3 x weekly - 2 sets - 10 reps  · Shoulder External Rotation and Scapular Retraction with Resistance - 1 x daily - 3 x weekly - 2 sets - 10 reps  · Standing Overhead Press with Dumbbells at [place] - 1 x daily - 3 x weekly - 2 sets - 10 reps  · Scaption with Dumbbells - 1 x daily - 3 x weekly - 2 sets - 10 reps  · Wall Clock with Theraband - 1 x daily - 3 x weekly - 1-2 sets - 10 reps  · Shoulder Flexion Serratus Activation with Resistance - 1 x daily - 3 x weekly - 1-2 sets - 10 reps  · Standing shoulder full range flexion - 1 x daily - 3 x weekly - 2 sets - 10 reps  · Wall Push Up - 1 x daily - 3 x weekly - 1-2 sets - 10 reps

## 2023-12-06 ENCOUNTER — EVALUATION (OUTPATIENT)
Dept: PHYSICAL THERAPY | Facility: CLINIC | Age: 38
End: 2023-12-06
Payer: COMMERCIAL

## 2023-12-06 DIAGNOSIS — M54.2 NECK PAIN ON LEFT SIDE: ICD-10-CM

## 2023-12-06 DIAGNOSIS — M50.322 OTHER CERVICAL DISC DEGENERATION AT C5-C6 LEVEL: Primary | ICD-10-CM

## 2023-12-06 PROCEDURE — 97161 PT EVAL LOW COMPLEX 20 MIN: CPT

## 2023-12-06 PROCEDURE — 97110 THERAPEUTIC EXERCISES: CPT

## 2023-12-06 NOTE — LETTER
2023    Hattie Morris, [address] [facility] [address]    Patient: Bora Reddy   YOB: 1985   Date of Visit: 2023     Encounter Diagnosis     ICD-10-CM    1. Other cervical disc degeneration at C5-C6 level  M50.322       2. Neck pain on left side  M54.2           Dear Dr. Wiggins :    Thank you for your recent referral of Bora Nurse. Please review the attached evaluation summary from [facility] recent visit. Please verify that you agree with the plan of care by signing the attached order. If you have any questions or concerns, please do not hesitate to call. I sincerely appreciate the opportunity to share in the care of one of your patients and hope to have another opportunity to work with you in the near future. Sincerely,    Ronan Reveles, PT      Referring Provider:      I certify that I have read the below Plan of Care and certify the need for these services furnished under this plan of treatment while under my care. Hattie Morris MD  [facility] [address]  Via Fax: [phone]          PT Evaluation     Today's date: 2023  Patient name: Bora Nurse  : 1985  MRN: [id number]  Referring provider: Hattie Morris MD  Dx:   Encounter Diagnosis     ICD-10-CM    1. Other cervical disc degeneration at C5-C6 level  M50.322       2. Neck pain on left side  M54.2                      Assessment  Assessment details: Bora Nurse is a 34 y.o. L hand dominant female who presents with chief complaint of reoccurring L neck pain. Pt presents with limited cervical AROM, positive cervical distraction test, LUE weakness, bilateral middle/ lower trap weakness, bilateral latissimus dorsi weakness, and limited L shoulder AROM. Symptoms respond with repeated cervical retraction with cervical and thoracic extension upon mechanical assessment to determine directional preference.  Due to presenting body impairments, pt is unable to maintain prolonged positions including sitting for greater than 1 hour or forward reaching of bilateral UE affecting her ability to sit in Zoroastrianism, clean, and engage in recreational activities. Pt educated in pathology, review of impairements, prognosis, activity modification, POC, and HEP. Pt will benefit from skilled physical therapy to address deficits in range of motion, strength and function and return to PLOF by reaching short and long term goals. Impairments: abnormal or restricted ROM, activity intolerance, lacks appropriate home exercise program, pain with function, poor posture  and poor body mechanics  Understanding of Dx/Px/POC: good   Prognosis: good    Goals  Short Tem Goals to be met in 4 weeks (target date 1/3/2024)  1. Pt to be independent w/ HEP in order to demonstrate active participation in recovery. 2. Reduce c/o pain to 0-3/10 with activity and prolonged positions in order to attend Zoroastrianism/ participate in cleaning activities. 3. Restore cervical AROM to nil limitation w/o pain in order to return to driving w/out compensatory trunk rotation. 4. Pt will improve L shoulder flex AROM to be within 5 degrees L in order to reach items off top shelves. Long Term Goal to be met in 12 weeks (target date 2/28/2024)  1. Pt will demonstrate improved R shoulder flex AROM by 10 degrees in order to lift clean w/ dominant UE. 2. Pt will be able to hold bilateral UE at 90 degrees for 60 seconds w/ correct posture to demonstrate improved muscular endurance necessary for mopping and scrubbing tub. 3. Pt will improve B/L middle/ lower trap and latissimus dorsi strength to 5/5 to allow lifting and carrying necessary for improved posture w/ functional activities. 4. Pt to achieve full symptom prevention/resolution via HEP. Plan  Plan details: [facility] Rules do not indicate need for imaging.  HEP development and progression, monitor patients adherence to activity modification to ensure adequate healing time/ recovery. Implement stretching, A/AA/PROM, joint mobilizations, posture education, STM/MI as needed to reduce muscle tension, muscle reeducation. Progress strengthening; improve pt's tolerance to UE WB and functional activity. POC discussed and agreed upon with patient. Planned modality interventions: traction, unattended electrical stimulation and thermotherapy: hydrocollator packs  Planned therapy interventions: joint mobilization, manual therapy, abdominal trunk stabilization, patient education, postural training, stretching, transfer training, home exercise program and strengthening  Frequency: 2-3x/week. Plan of Care beginning date: 12/6/2023  Plan of Care expiration date: 2/28/2024  Treatment plan discussed with: patient    Subjective Evaluation    History of Present Illness  Mechanism of injury: Suly Kwon is a 34 y.o. female who returns to PT for evaluation with chief complaint of L neck pain. Pt states that has received PT treatment previously which offered relief. States that the pain she is experiencing is due to a reoccurring injury. Pain returned in May of 2023 while getting down to clean her tub; states that she re-aggravated her neck. She experienced bilateral neck pain and numbness/tingling into her hands. She restarted her home PT exercises and began to experience improvement in symptoms. States that she was doing well into August. However 3 months ago she states that even her home PT exercises, ice, home traction unit, Tylenol and Advil did not provide relief. States that she would sit in Taoism for 1 hour and have to return home immediately afterward to lie down. States that her visit w/ MD for current pain was 1 month ago; due to busy schedule she was unable to attend PT initially. States that MD provided muscle relaxer which has improved her symptoms and left cervical spine muscle spasm. Reports that numbness and tingling has resolved.  She did obtain x-ray imaging indicating increase in C5-C6 degeneration since previous imaging. Exacerbating activities include sitting, mopping the floor, scrubbing bath, and high impact activity (pt uses example of hiking). Pt has 3 children; states that two of her children have ADHD, therefore for her role as a mother is highly active. States that her ROM is limited with driving. Lifting overhead is not an issue however, pt notices that she utilizes compensatory movements. Pt works a part time psychologist 2 days/ week. Patient Goals  Patient goals for therapy: decreased pain, increased strength, independence with ADLs/IADLs, return to sport/leisure activities and increased motion  Patient goal: Return to ADLs w/out limitation  Pain  Current pain rating: 3  At best pain ratin  At worst pain ratin  Location: L upper trapezius; primary pain at level of C4/C5/C6  Quality: dull ache, sharp and burning (Dull/ constant)  Aggravating factors: sitting and lifting (Prolonged positions)    Hand dominance: left      Diagnostic Tests  X-ray: abnormal  Treatments  Previous treatment: medication and physical therapy (FLEXERIL PRN)  Current treatment: physical therapy    Objective  SPECIAL TESTS:      2023  Alar ligament:    (-)  Sharp Kylie:    NT  Spurling's (sagittal plane):  (-)  Distraction:    (+)    2023  Pt w/ report of intermittent headaches. Pt denies dizziness and diplopia currently. Pt denies nasea/vomiting. Pt states that in August when symptom irritability was high she experiences brief episodic dizziness.      DERMATOMAL TESTING     2023     RIGHT  LEFT     (Light touch) (Light touch)  C1 top of head:  intact  intact  C2 behind ear:  intact  intact    C3 lat/ant neck:  intact  intact    C4  upper trap:  intact  intact    C5 lat arm:   intact  intact    C6 thumb:   intact  intact    C7 middle finger:  intact  intact    C8 5th finger:   intact  intact      MYOTOMAL TESTING     2023     R  L  C4 Shru/5  5/5  C5 shld abd:   4+/5  4+/5  C6 bicep:    4/5  4+/5  C7 tricep:   5/5  4/5  C8 finger flexion:  5/5  4/5     MMT:     2023     R  L  Middle trap  3+/5  3+/5  Lower trap/lat   3+/5  3+/5    DTR's   2023  Biceps (C5-6):  NT  Brachioradialis (C6):  NT  Triceps (C7):   NT    UE AROM standin2023     Right  Left  Shoulder flexion: 180  180  Shoulder abduction: 180  170  Shoulder ER @90 95  90  Funct IR behind back T1  Spine of scap       MOBILITY ASSESSMENT:   2023  Cervical AROM limitation  Flexion      nil cordero*            Extension   min cordero            R rotation    min cordero             L rotation    min cordero             R Sidebend   min cordero             L sidebend   min cordero                Retraction     nil cordero             Protraction    nil cordero    UPPER LIMB TENSION TESTS:  2023:    Median, Ulnar and Radial nerve tension tests R = NT  Median, Ulnar and Radial nerve Tension tests L = NT    MECHANICAL ASSESSMENT  2023  Pretest symptoms include 4/10 pain at level of C4 on L, along length of UT  Repeated cervical retraction w/ OP 5" x 10 = increase during, NE at completion  Repeated cervical retraction w/OP and extension 1 x 10 = NE  Repeated cervical retraction w/OP, cervical and thoracic extension 1 x 7 (d/c d/t pain) = painful during, centralized afterward, pt report of min improvement in pain, no pain scale provided. Palpation:  2023: Pt denies TTP along transverse process of C-spine bilaterally. Pt w/ increased palpable tension of L upper trapezius despite pt report of no pain. FUNCTION:   2023:   Pt reports limited tolerance to sitting; 1 hr at [employer] needs to return home and lie down  Cleaning task: unable to mob/ scrub floor    Flowsheet Rows      Flowsheet Row Most Recent Value   PT/OT G-Codes    Current Score 56   Projected Score 66               Precautions:   History reviewed.  No pertinent past medical history. History reviewed. No pertinent surgical history. SOC: 12/6/2023  FOTO: 12/6/2023  POC Expiration: 2/28/2023  Daily Treatment Log  Date: Initial Evaluation  12/6/2023 Next session         Visit#/ auth: 1           Objective Measures                           Manuals             Suboccipital release  60" x 1                                                     Neuro Re-Ed                                                                                                               Ther Ex  10'           Chin tuck  5" x 10           Chin tuck w/ self OP  5" x 10           Chin tuck w/ cervical extension  1 x 10           Cervical/ thoracic extension  1 x 10                                                     EDUCATION: Pathology, review of impairements, prognosis, activity modification, POC, and HEP             Ther Activity                                         Gait Training                                         Modalities                                         HEP:   Access Code: [email or web address]  URL: [email or web address]  Date: 12/06/2023  Prepared by: Kristne Lara    Exercises  - Seated Cervical Extension AROM  - 4-5 x daily - 1 sets - 10 reps

## 2023-12-06 NOTE — PROGRESS NOTES
PT Evaluation     Today's date: 2023  Patient name: Jeanette Alarcon  : 1985  MRN: [id number]  Referring provider: Kiet Alva MD  Dx:   Encounter Diagnosis     ICD-10-CM    1. Other cervical disc degeneration at C5-C6 level  M50.322       2. Neck pain on left side  M54.2                      Assessment  Assessment details: Jeanette Alarcon is a 34 y.o. L hand dominant female who presents with chief complaint of reoccurring L neck pain. Pt presents with limited cervical AROM, positive cervical distraction test, LUE weakness, bilateral middle/ lower trap weakness, bilateral latissimus dorsi weakness, and limited L shoulder AROM. Symptoms respond with repeated cervical retraction with cervical and thoracic extension upon mechanical assessment to determine directional preference. Due to presenting body impairments, pt is unable to maintain prolonged positions including sitting for greater than 1 hour or forward reaching of bilateral UE affecting her ability to sit in Christianity, clean, and engage in recreational activities. Pt educated in pathology, review of impairements, prognosis, activity modification, POC, and HEP. Pt will benefit from skilled physical therapy to address deficits in range of motion, strength and function and return to PLOF by reaching short and long term goals. Impairments: abnormal or restricted ROM, activity intolerance, lacks appropriate home exercise program, pain with function, poor posture  and poor body mechanics  Understanding of Dx/Px/POC: good   Prognosis: good    Goals  Short Tem Goals to be met in 4 weeks (target date 1/3/2024)  1. Pt to be independent w/ HEP in order to demonstrate active participation in recovery. 2. Reduce c/o pain to 0-3/10 with activity and prolonged positions in order to attend Christianity/ participate in cleaning activities. 3. Restore cervical AROM to nil limitation w/o pain in order to return to driving w/out compensatory trunk rotation.    4. Pt will improve L shoulder flex AROM to be within 5 degrees L in order to reach items off top shelves. Long Term Goal to be met in 12 weeks (target date 2/28/2024)  1. Pt will demonstrate improved R shoulder flex AROM by 10 degrees in order to lift clean w/ dominant UE. 2. Pt will be able to hold bilateral UE at 90 degrees for 60 seconds w/ correct posture to demonstrate improved muscular endurance necessary for mopping and scrubbing tub. 3. Pt will improve B/L middle/ lower trap and latissimus dorsi strength to 5/5 to allow lifting and carrying necessary for improved posture w/ functional activities. 4. Pt to achieve full symptom prevention/resolution via HEP. Plan  Plan details: [place] [facility]ervical Spine Rules do not indicate need for imaging. HEP development and progression, monitor patients adherence to activity modification to ensure adequate healing time/ recovery. Implement stretching, A/AA/PROM, joint mobilizations, posture education, STM/MI as needed to reduce muscle tension, muscle reeducation. Progress strengthening; improve pt's tolerance to UE WB and functional activity. POC discussed and agreed upon with patient. Planned modality interventions: traction, unattended electrical stimulation and thermotherapy: hydrocollator packs  Planned therapy interventions: joint mobilization, manual therapy, abdominal trunk stabilization, patient education, postural training, stretching, transfer training, home exercise program and strengthening  Frequency: 2-3x/week. Plan of Care beginning date: 12/6/2023  Plan of Care expiration date: 2/28/2024  Treatment plan discussed with: patient    Subjective Evaluation    History of Present Illness  Mechanism of injury: Bubba Gonzalez is a 34 y.o. female who returns to PT for evaluation with chief complaint of L neck pain. Pt states that has received PT treatment previously which offered relief. States that the pain she is experiencing is due to a reoccurring injury.  Pain returned in May of 2023 while getting down to clean her tub; states that she re-aggravated her neck. She experienced bilateral neck pain and numbness/tingling into her hands. She restarted her home PT exercises and began to experience improvement in symptoms. States that she was doing well into August. However 3 months ago she states that even her home PT exercises, ice, home traction unit, Tylenol and Advil did not provide relief. States that she would sit in Hinduism for 1 hour and have to return home immediately afterward to lie down. States that her visit w/ MD for current pain was 1 month ago; due to busy schedule she was unable to attend PT initially. States that MD provided muscle relaxer which has improved her symptoms and left cervical spine muscle spasm. Reports that numbness and tingling has resolved. She did obtain x-ray imaging indicating increase in C5-C6 degeneration since previous imaging. Exacerbating activities include sitting, mopping the floor, scrubbing bath, and high impact activity (pt uses example of hiking). Pt has 3 children; states that two of her children have ADHD, therefore for her role as a mother is highly active. States that her ROM is limited with driving. Lifting overhead is not an issue however, pt notices that she utilizes compensatory movements. Pt works a part time psychologist 2 days/ week.    Patient Goals  Patient goals for therapy: decreased pain, increased strength, independence with ADLs/IADLs, return to sport/leisure activities and increased motion  Patient goal: Return to ADLs w/out limitation  Pain  Current pain rating: 3  At best pain ratin  At worst pain ratin  Location: L upper trapezius; primary pain at level of C4/C5/C6  Quality: dull ache, sharp and burning (Dull/ constant)  Aggravating factors: sitting and lifting (Prolonged positions)    Hand dominance: left      Diagnostic Tests  X-ray: abnormal  Treatments  Previous treatment: medication and physical therapy (FLEXERIL PRN)  Current treatment: physical therapy    Objective  SPECIAL TESTS:      2023  Alar ligament:    (-)  Sharp Kylie:    NT  Spurling's (sagittal plane):  (-)  Distraction:    (+)    2023  Pt w/ report of intermittent headaches. Pt denies dizziness and diplopia currently. Pt denies nasea/vomiting. Pt states that in August when symptom irritability was high she experiences brief episodic dizziness.      DERMATOMAL TESTING     2023     RIGHT  LEFT     (Light touch) (Light touch)  C1 top of head:  intact  intact  C2 behind ear:  intact  intact    C3 lat/ant neck:  intact  intact    C4  upper trap:  intact  intact    C5 lat arm:   intact  intact    C6 thumb:   intact  intact    C7 middle finger:  intact  intact    C8 5th finger:   intact  intact      MYOTOMAL TESTING     2023     R  L  C4 Shru/5  5/5  C5 shld abd:   4+/5  4+/5  C6 bicep:    4/5  4+/5  C7 tricep:   5/5  4/5  C8 finger flexion:  5/5  4/5     MMT:     2023     R  L  Middle trap  3+/5  3+/5  Lower trap/lat   3+/5  3+/5    DTR's   2023  Biceps (C5-6):  NT  Brachioradialis (C6):  NT  Triceps (C7):   NT    UE AROM standin2023     Right  Left  Shoulder flexion: 180  180  Shoulder abduction: 180  170  Shoulder ER @90 95  90  Funct IR behind back T1  Spine of scap       MOBILITY ASSESSMENT:   2023  Cervical AROM limitation  Flexion      nil cordero*            Extension   min cordero            R rotation    min cordero             L rotation    min cordero             R Sidebend   min cordero             L sidebend   min cordero                Retraction     nil cordero             Protraction    nil cordero    UPPER LIMB TENSION TESTS:  2023:    Median, Ulnar and Radial nerve tension tests R = NT  Median, Ulnar and Radial nerve Tension tests L = NT    MECHANICAL ASSESSMENT  2023  Pretest symptoms include 4/10 pain at level of C4 on L, along length of UT  Repeated cervical retraction w/ OP 5" x 10 = increase during, NE at completion  Repeated cervical retraction w/OP and extension 1 x 10 = NE  Repeated cervical retraction w/OP, cervical and thoracic extension 1 x 7 (d/c d/t pain) = painful during, centralized afterward, pt report of min improvement in pain, no pain scale provided. Palpation:  12/6/2023: Pt denies TTP along transverse process of C-spine bilaterally. Pt w/ increased palpable tension of L upper trapezius despite pt report of no pain. FUNCTION:   12/6/2023:   Pt reports limited tolerance to sitting; 1 hr at [employer] needs to return home and lie down  Cleaning task: unable to mob/ scrub floor    Flowsheet Rows      Flowsheet Row Most Recent Value   PT/OT G-Codes    Current Score 56   Projected Score 66               Precautions:   History reviewed. No pertinent past medical history. History reviewed. No pertinent surgical history. SOC: 12/6/2023  FOTO: 12/6/2023  POC Expiration: 2/28/2023  Daily Treatment Log  Date: Initial Evaluation  12/6/2023 Next session         Visit#/ auth: 1           Objective Measures                           Manuals             Suboccipital release  60" x 1                                                     Neuro Re-Ed                                                                                                               Ther Ex  10'           Chin tuck  5" x 10           Chin tuck w/ self OP  5" x 10           Chin tuck w/ cervical extension  1 x 10           Cervical/ thoracic extension  1 x 10                                                     EDUCATION: Pathology, review of impairements, prognosis, activity modification, POC, and HEP             Ther Activity                                         Gait Training                                         Modalities                                         HEP:   Access Code: [email or web address]  URL: [email or web address]  Date: 12/06/2023  Prepared by: Júnior Velasco English    Exercises  - Seated Cervical Extension AROM  - 4-5 x daily - 1 sets - 10 reps

## 2023-12-08 ENCOUNTER — TELEPHONE (OUTPATIENT)
Dept: PHYSICAL THERAPY | Facility: CLINIC | Age: 38
End: 2023-12-08

## 2023-12-08 NOTE — TELEPHONE ENCOUNTER
left a message for patient stating we do not have authorization yet and that 12/8 appt needed to be canceled at this time and would keep in touch regarding 12/14 appt.      Ramirez Gould, PT, DPT

## 2023-12-11 ENCOUNTER — APPOINTMENT (OUTPATIENT)
Dept: PHYSICAL THERAPY | Facility: CLINIC | Age: 38
End: 2023-12-11
Payer: COMMERCIAL

## 2023-12-14 ENCOUNTER — OFFICE VISIT (OUTPATIENT)
Dept: PHYSICAL THERAPY | Facility: CLINIC | Age: 38
End: 2023-12-14
Payer: COMMERCIAL

## 2023-12-14 DIAGNOSIS — M50.322 OTHER CERVICAL DISC DEGENERATION AT C5-C6 LEVEL: Primary | ICD-10-CM

## 2023-12-14 DIAGNOSIS — M54.2 NECK PAIN ON LEFT SIDE: ICD-10-CM

## 2023-12-14 PROCEDURE — 97140 MANUAL THERAPY 1/> REGIONS: CPT

## 2023-12-14 PROCEDURE — 97110 THERAPEUTIC EXERCISES: CPT

## 2023-12-14 NOTE — PROGRESS NOTES
Daily Note     Today's date: 2023  Patient name: Criss Otero  : 1985  MRN: [id number]  Referring provider: Teagan Rodriguez MD  Dx:   Encounter Diagnosis     ICD-10-CM    1. Other cervical disc degeneration at C5-C6 level  M50.322       2. Neck pain on left side  M54.2                      Subjective: Pt has been compliant with chin tucks at home; states that they have provided relief. Noticed one brief period of numbness/tingling into L hand since last session. Objective: See treatment diary below    UPPER LIMB TENSION TESTS:  2023   Nerve tension tests R = + Median, Ulnar and Radial (Median and ulnar restriction greater than radial)  Nerve Tension tests L = + Median, Ulnar and Radial (Median and ulnar restriction greater than radial)    DTR's   2023  Biceps (C5-6):  2+ bilaterally  Brachioradialis (C6):  2+ bilaterally  Triceps (C7):   Unable to elicit bilaterally (tested in prone)      Assessment: Pt with bilateral UE nerve tension on assessment; median and ulnar restrictions greater than radial. Pt with report of LUE (digit 4-5) tingling after UE nerve glides. Pt required VC for scapular retraction and depression and cervical retraction throughout session. Tolerated treatment well. Patient would benefit from continued PT. Plan: Continue per plan of care. Progress treatment as tolerated. Precautions:   History reviewed. No pertinent past medical history. History reviewed. No pertinent surgical history.       SOC: 2023  FOTO: 2023  POC Expiration: 2023  Daily Treatment Log  Date: Initial Evaluation  2023         Visit#/ auth: 1  2         Objective Measures             UE nerve tension   See objective         UE reflexes  See objective      Manuals    15'         Suboccipital release  60" x 1           STM UT    KE B/L         Nerve glides    KE ulnar, median, radial B/L                       Neuro Re-Ed Ther Ex  10'  30'         Chin tuck  5" x 10           Chin tuck w/ self OP  5" x 10           Chin tuck w/ cervical extension  1 x 10 Chin tuck w/ self op and cervical ext 1 x 10         Cervical/ thoracic extension  1 x 10           Supine chin tuck             UT stretch    20" x 2 R/L         SNAG    5" x 3 R/L         Scapular retraction  5" x 10       Shoulder ER AROM  1 x 10 B/L     1 x 10 B/L w/ YTB      Doorway pec stretch  20" x 3       EDUCATION: Pathology, review of impairements, prognosis, activity modification, POC, and HEP    HEP updated and reviewed         Ther Activity                                         Gait Training                                         Modalities                                         HEP:   Access Code: [email or web address]  URL: [email or web address]  Date: 12/14/2023  Prepared by: Nic Croft    Exercises  - Seated Cervical Extension AROM  - 4-5 x daily - 1 sets - 10 reps  - Seated Gentle Upper Trapezius Stretch  - 2 x daily - 2 reps - 20 seconds hold  - Doorway Pec Stretch at 90 Degrees Abduction  - 1 x daily - 2 reps - 20 sec hold  - Seated Scapular Retraction  - 1-2 x daily - 1 sets - 10 reps - 5 seconds hold

## 2023-12-18 ENCOUNTER — OFFICE VISIT (OUTPATIENT)
Dept: PHYSICAL THERAPY | Facility: CLINIC | Age: 38
End: 2023-12-18
Payer: COMMERCIAL

## 2023-12-18 DIAGNOSIS — M50.322 OTHER CERVICAL DISC DEGENERATION AT C5-C6 LEVEL: Primary | ICD-10-CM

## 2023-12-18 DIAGNOSIS — M54.2 NECK PAIN ON LEFT SIDE: ICD-10-CM

## 2023-12-18 PROCEDURE — 97110 THERAPEUTIC EXERCISES: CPT

## 2023-12-18 PROCEDURE — 97140 MANUAL THERAPY 1/> REGIONS: CPT

## 2023-12-18 NOTE — PROGRESS NOTES
"Daily Note     Today's date: 2023  Patient name: Yue Harris  : 1985  MRN: [id number]  Referring provider: Dereck Milligan MD  Dx:   Encounter Diagnosis     ICD-10-CM    1. Other cervical disc degeneration at C5-C6 level  M50.322       2. Neck pain on left side  M54.2                      Subjective: Pt reports to PT with complaint of neck pain after having a busy weekend. States that she was playing the piano and assisting her  with laying down rugs where she struggled to keep appropriate posture. States that all of the activities she did this weekend have accumulated.         Objective: See treatment diary below      Assessment: Pt demonstrates improved seated thoracic ROM with half foam posterior to t-spine. Continues to present with bilateral UE nerve tension; ulnar with greatest restriction on L, median with greatest restriction on R. Introduced to prone I and T; no complaint of pain. Tolerated treatment well. Patient would benefit from continued PT.      Plan: Continue per plan of care.  Progress treatment as tolerated.       Precautions:   History reviewed. No pertinent past medical history.  History reviewed. No pertinent surgical history.      SOC: 2023  FOTO: 2023  POC Expiration: 2023  Daily Treatment Log  Date: Initial Evaluation  2023  Next session     Visit#/ auth: 1  2  3       Objective Measures             UE nerve tension   See objective         UE reflexes  See objective      Manuals    15'  10'       Suboccipital release  60\" x 1    60\" x 3       STM UT    KE B/L         Nerve glides   KE ulnar, median, radial B/L  KE ulnar, median, radial B/L                     Neuro Re-Ed                                                                                                               Ther Ex  10'  30'  30'       Chin tuck  5\" x 10           Chin tuck w/ self OP  5\" x 10   Chin tuck w/ self op 5\" and cervical ext 1 x 10       Chin " "tuck w/ cervical extension  1 x 10 Chin tuck w/ self op and cervical ext 1 x 10  1 x 10       Cervical/ thoracic extension  1 x 10   1 x 10  1 x 5 w/ half foam horiz across t-spine       Supine chin tuck             UT stretch    20\" x 2 R/L 20\" x 2 R/L       Levator stretch   20\" x 2 R/L     SNAG    5\" x 3 R/L 10\" x 3 R/L       Scapular retraction  5\" x 10  5\" x 10      Shoulder ER AROM  1 x 10 B/L     1 x 10 B/L w/ YTB 2 x 10 B/L w/ YTB     Cookie sheets   1 x 10 YTB     Doorway pec stretch  20\" x 3  30\" x 2     Prone I, Y, T    I B/L 2 x 10   T uni 2 x 10     EDUCATION: Pathology, review of impairements, prognosis, activity modification, POC, and HEP   HEP updated and reviewed    (Update this session; add middle trap strengthening if tolerable)     Ther Activity                                         Gait Training                                         Modalities                                         HEP:   Access Code: [email or web address]  URL: [email or web address]  Date: 12/14/2023  Prepared by: Chrissie Lugo    Exercises  - Seated Cervical Extension AROM  - 4-5 x daily - 1 sets - 10 reps  - Seated Gentle Upper Trapezius Stretch  - 2 x daily - 2 reps - 20 seconds hold  - Doorway Pec Stretch at 90 Degrees Abduction  - 1 x daily - 2 reps - 20 sec hold  - Seated Scapular Retraction  - 1-2 x daily - 1 sets - 10 reps - 5 seconds hold           "

## 2023-12-21 ENCOUNTER — OFFICE VISIT (OUTPATIENT)
Dept: PHYSICAL THERAPY | Facility: CLINIC | Age: 38
End: 2023-12-21
Payer: COMMERCIAL

## 2023-12-21 DIAGNOSIS — M50.322 OTHER CERVICAL DISC DEGENERATION AT C5-C6 LEVEL: Primary | ICD-10-CM

## 2023-12-21 DIAGNOSIS — M54.2 NECK PAIN ON LEFT SIDE: ICD-10-CM

## 2023-12-21 PROCEDURE — 97110 THERAPEUTIC EXERCISES: CPT

## 2023-12-21 PROCEDURE — 97140 MANUAL THERAPY 1/> REGIONS: CPT

## 2023-12-21 NOTE — PROGRESS NOTES
"Daily Note     Today's date: 2023  Patient name: Yue Harris  : 1985  MRN: [id number]  Referring provider: Dereck Milligan MD  Dx:   Encounter Diagnosis     ICD-10-CM    1. Other cervical disc degeneration at C5-C6 level  M50.322       2. Neck pain on left side  M54.2                      Subjective: Pt states that she experienced soreness in L cervical spine yesterday as she was in the car for 2 hours and was sitting for 3 hours to watch a play; states that chair did not allow for good posture. Toward the end of the afternoon she experienced slight head ache. States that during the evening the soreness in her neck subsided. However this morning when she woke up she was very irritable and sore.      Objective: See treatment diary below      Assessment: Due to irritability at arrival to session, initiated visit w/ light active cervical retraction and UT STM which offered relief. Pt able to complete session with min complaint of pain. Required verbal cues for cervical retraction and extension exercise to limit scapular elevation. Continues to require VC for appropriate cervical posture with periscapular strengthening. Tolerated treatment well. Patient would benefit from continued PT.       Plan: Continue per plan of care.  Progress treatment as tolerated.       Precautions:   History reviewed. No pertinent past medical history.  History reviewed. No pertinent surgical history.      SOC: 2023  FOTO: 2023  POC Expiration: 2023  Daily Treatment Log  Date: Initial Evaluation  2023     Visit#/ auth: 1  2  3  4     Objective Measures             UE nerve tension   See objective         UE reflexes  See objective      Manuals    15'  10'  10'     Suboccipital release  60\" x 1    60\" x 3       STM UT    KE B/L   KE B/L in sitting     Nerve glides   KE ulnar, median, radial B/L  KE ulnar, median, radial B/L                     Neuro Re-Ed                  " "                                                                                             Ther Ex  10'  30'  30'  30'     Chin tuck  5\" x 10      5\" x 10     Chin tuck w/ self OP  5\" x 10   Chin tuck w/ self op 5\" and cervical ext 1 x 10       Chin tuck w/ cervical extension  1 x 10 Chin tuck w/ self op and cervical ext 1 x 10  1 x 10       Cervical/ thoracic extension  1 x 10   1 x 10  1 x 5 w/ half foam horiz across t-spine  1 x 10 w/ half foam horiz across t-spine     Supine chin tuck             UT stretch    20\" x 2 R/L 20\" x 2 R/L 30\" x 2 R/L     Levator stretch   20\" x 2 R/L 30\" x 2 R/L    SNAG    5\" x 3 R/L 10\" x 3 R/L 10\" x 3 R/L     Scapular retraction  5\" x 10  5\" x 10      Scap set w/ low row    2 x 10 yellow tubing     Full abd arch AROM    1  x 10 B/L     Shoulder ER AROM  1 x 10 B/L     1 x 10 B/L w/ YTB 2 x 10 B/L w/ YTB 1 x 10 B/L w/ YTB    Cookie sheets   1 x 10 YTB 1 x 10 YTB    Doorway pec stretch  20\" x 3  30\" x 2 30\" x 2    Prone I, Y, T    I B/L 2 x 10   T uni 2 x 10 T uni 2 x 10    EDUCATION: Pathology, review of impairements, prognosis, activity modification, POC, and HEP   HEP updated and reviewed      (Update this session; add middle trap strengthening)   Ther Activity                                         Gait Training                                         Modalities                                         HEP:   Access Code: [email or web address]  URL: [email or web address]  Date: 12/14/2023  Prepared by: Chrissie Lugo    Exercises  - Seated Cervical Extension AROM  - 4-5 x daily - 1 sets - 10 reps  - Seated Gentle Upper Trapezius Stretch  - 2 x daily - 2 reps - 20 seconds hold  - Doorway Pec Stretch at 90 Degrees Abduction  - 1 x daily - 2 reps - 20 sec hold  - Seated Scapular Retraction  - 1-2 x daily - 1 sets - 10 reps - 5 seconds hold             "

## 2023-12-26 ENCOUNTER — OFFICE VISIT (OUTPATIENT)
Dept: PHYSICAL THERAPY | Facility: CLINIC | Age: 38
End: 2023-12-26
Payer: COMMERCIAL

## 2023-12-26 DIAGNOSIS — M54.2 NECK PAIN ON LEFT SIDE: ICD-10-CM

## 2023-12-26 DIAGNOSIS — M50.322 OTHER CERVICAL DISC DEGENERATION AT C5-C6 LEVEL: Primary | ICD-10-CM

## 2023-12-26 PROCEDURE — 97110 THERAPEUTIC EXERCISES: CPT

## 2023-12-26 NOTE — PROGRESS NOTES
Daily Note     Today's date: 2023  Patient name: Yue Harris  : 1985  MRN: [id number]  Referring provider: Dereck Milligan MD  Dx:   Encounter Diagnosis     ICD-10-CM    1. Other cervical disc degeneration at C5-C6 level  M50.322       2. Neck pain on left side  M54.2                      Subjective: Pt states that she is not as sore as she expected today. She did a lot of cleaning over the weekend which caused her to be sore initially but not too much the following day. Pt states that she tried to self correct posture throughout cleaning tasks. States that she was sitting in recliner chair too big for her frame which caused her neck to bother her. Placed pillows across lumbar spine which did not provide relief. States that by the end of day yesterday her hips were bothering her; R hip worse than left.       Objective: See treatment diary below      Assessment: Pt introduced to modified latissimus pull down for scapular depression muscle reeducation. Pt able to progress to prone Y today w/ meir scapular strengthening. Assessed pelvic alignment and leg length;  R ASIS slightly superior to L on initial assessment; ASIS equal bilaterally in supine following pelvic shot gun. Tolerated treatment well. Patient would benefit from continued PT.       Plan: Continue per plan of care.  Progress treatment as tolerated.       Precautions:   History reviewed. No pertinent past medical history.  History reviewed. No pertinent surgical history.      SOC: 2023  FOTO: 2023  POC Expiration: 2023  Daily Treatment Log  Date: Initial Evaluation  2023   Visit#/ auth: 1  2  3  4  5  FOTO   Objective Measures             UE nerve tension   See objective         UE reflexes  See objective      Pelvic alignment/ leg length     KE: No gross LLD noted; R ASIS slightly superior to L on initial assessment in supine; ASIS equal bilaterally following pelvic shot  "gun   Manuals    15'  10'  10'  5'   Suboccipital release  60\" x 1    60\" x 3       STM UT    KE B/L   KE B/L in sitting     Nerve glides   KE ulnar, median, radial B/L  KE ulnar, median, radial B/L        Pelvic shot gun (MET)          KE   Neuro Re-Ed                                                                                                               Ther Ex  10'  30'  30'  30'  40'   Chin tuck  5\" x 10      5\" x 10     Chin tuck w/ self OP  5\" x 10   Chin tuck w/ self op 5\" and cervical ext 1 x 10   Chin tuck w/ self op 5\" and cervical ext 1 x 10   Chin tuck w/ cervical extension  1 x 10 Chin tuck w/ self op and cervical ext 1 x 10  1 x 10       Cervical/ thoracic extension  1 x 10   1 x 10  1 x 5 w/ half foam horiz across t-spine  1 x 10 w/ half foam horiz across t-spine     UT stretch    20\" x 2 R/L 20\" x 2 R/L 30\" x 2 R/L 30\" x 2 R/L   Levator stretch   20\" x 2 R/L 30\" x 2 R/L 30\" x 2 R/L   SNAG    5\" x 3 R/L 10\" x 3 R/L 10\" x 3 R/L 10\" x 3 R/L   Scapular retraction  5\" x 10  5\" x 10      Scap set w/ low row    2 x 10 yellow tubing     Scap set w/ modified lat pull      1 x 10 w/ 2\" hold   Full abd arch AROM    1  x 10 B/L  1 x 10 B/L w/ half foam along thoracic spine in standing   Full flex arch AROM     1 x 10 B/L w/ half foam along thoracic spine in standing   Shoulder ER AROM  1 x 10 B/L     1 x 10 B/L w/ YTB 2 x 10 B/L w/ YTB 1 x 10 B/L w/ YTB 1 x 10 B/L w/ RTB   Cookie sheets   1 x 10 YTB 1 x 10 YTB 1 x 10 YTB   Doorway pec stretch  20\" x 3  30\" x 2 30\" x 2 30\" x 2   Prone I, Y, T    I B/L 2 x 10   T uni 2 x 10 T uni 2 x 10 I B/L 2 x 10   T uni 2 x 10  Y B/L 1 x 10   EDUCATION: Pathology, review of impairements, prognosis, activity modification, POC, and HEP   HEP updated and reviewed     Updated and reviewed   Ther Activity                                         Gait Training                                         Modalities                                         HEP:   Access Code: " [email or web address]  URL: [email or web address]  Date: 12/14/2023  Prepared by: Chrissie Lugo    Exercises  - Seated Cervical Extension AROM  - 4-5 x daily - 1 sets - 10 reps  - Seated Gentle Upper Trapezius Stretch  - 2 x daily - 2 reps - 20 seconds hold  - Doorway Pec Stretch at 90 Degrees Abduction  - 1 x daily - 2 reps - 20 sec hold  - Seated Scapular Retraction  - 1-2 x daily - 1 sets - 10 reps - 5 seconds hold

## 2023-12-28 ENCOUNTER — OFFICE VISIT (OUTPATIENT)
Dept: PHYSICAL THERAPY | Facility: CLINIC | Age: 38
End: 2023-12-28
Payer: COMMERCIAL

## 2023-12-28 DIAGNOSIS — M50.322 OTHER CERVICAL DISC DEGENERATION AT C5-C6 LEVEL: Primary | ICD-10-CM

## 2023-12-28 DIAGNOSIS — M54.2 NECK PAIN ON LEFT SIDE: ICD-10-CM

## 2023-12-28 PROCEDURE — 97110 THERAPEUTIC EXERCISES: CPT

## 2023-12-28 NOTE — PROGRESS NOTES
"Daily Note     Today's date: 2023  Patient name: Yue Harris  : 1985  MRN: [id number]  Referring provider: Dereck Milligan MD  Dx:   Encounter Diagnosis     ICD-10-CM    1. Other cervical disc degeneration at C5-C6 level  M50.322       2. Neck pain on left side  M54.2                      Subjective: Pt states that her neck is feeling pretty good this morning. States that she had a quiet morning. Hip pain was present again Tuesday night and low back pain was present as well; pain is on and off. Pt plans to do press ups at home to manage pain as she typically does.        Objective: See treatment diary below      Assessment: Pt informed of direct access PT eval if hip pain becomes chief complaint; informed that hip and low back pain will be monitored throughout session and exercises will be modified accordingly. Pt introduced to full lat pull down for scapular depression muscle re-ed/ strengthening. Pt with report of min crepitation with full arch shoulder flexion and abduction ROM; denies discomfort and pain. Tolerated treatment well. Patient would benefit from continued PT.      Plan: Focus on LUE periscapular strengthening next session. Continue per plan of care.  Progress treatment as tolerated.       Precautions:   History reviewed. No pertinent past medical history.  History reviewed. No pertinent surgical history.      SOC: 2023  FOTO: 2023  POC Expiration: 2023  Daily Treatment Log  Date: 2023 Next session  2023   Visit#/ auth: 6   3  4  5  FOTO   Objective Measures           UE nerve tension           UE reflexes        Pelvic alignment/ leg length     KE: No gross LLD noted; R ASIS slightly superior to L on initial assessment in supine; ASIS equal bilaterally following pelvic shot gun   Manuals    10'  10'  5'   Suboccipital release    60\" x 3       STM UT     KE B/L in sitting     Nerve glides    KE ulnar, median, radial B/L        " "Pelvic shot gun (MET)        KE   Neuro Re-Ed                                                                                               Ther Ex 40'   30'  30'  40'   Chin tuck 5\" x 5 vs YTB (X2)     5\" x 10     Chin tuck w/ self OP   Chin tuck w/ self op 5\" and cervical ext 1 x 10   Chin tuck w/ self op 5\" and cervical ext 1 x 10   Chin tuck w/ cervical extension    1 x 10       Cervical/ thoracic extension   1 x 10  1 x 5 w/ half foam horiz across t-spine  1 x 10 w/ half foam horiz across t-spine     UT stretch 30\" x 4 R/L  20\" x 2 R/L 30\" x 2 R/L 30\" x 2 R/L   Cervical side bend 1 x 5 R/L        Levator stretch 30\" x 2 R/L  20\" x 2 R/L 30\" x 2 R/L 30\" x 2 R/L   SNAG 5\" x 5 R/L  10\" x 3 R/L 10\" x 3 R/L 10\" x 3 R/L   Scapular retraction 5\" x 10   5\" x 10      Scap set w/ low row 2 x 10 yellow tubing    2 x 10 yellow tubing     Scap set w/ modified lat pull  1 x 10 uni lat pull down @ [place] 2.5#    1 x 10 w/ 2\" hold   Full abd arch AROM 1 x 10 B/L w/ half foam along thoracic spine in standing   1  x 10 B/L  1 x 10 B/L w/ half foam along thoracic spine in standing   Full flex arch AROM 1 x 10 B/L w/ half foam along thoracic spine in standing    1 x 10 B/L w/ half foam along thoracic spine in standing   Shoulder ER AROM Standing against wall 1 x 10 B/L RTB  2 x 10 B/L w/ YTB 1 x 10 B/L w/ YTB 1 x 10 B/L w/ RTB   Cookie sheets   1 x 10 YTB 1 x 10 YTB 1 x 10 YTB   Doorway pec stretch 30\" x 3  30\" x 2 30\" x 2 30\" x 2   Supine serratus punch 1 x 10 B/L       Prone I, Y, T  T uni 1 x 10  I B/L 2 x 10   T uni 2 x 10 T uni 2 x 10 I B/L 2 x 10   T uni 2 x 10  Y B/L 1 x 10   EDUCATION: Pathology, review of impairements, prognosis, activity modification, POC, and HEP HEP updated and reviewed      Updated and reviewed   Ther Activity                                   Gait Training                                   Modalities                                   HEP:   Access Code: [email or web address]  URL: " [email or web address]  Date: 12/28/2023  Prepared by: Chrissie Lugo    Exercises  - Seated Cervical Extension AROM  - 4-5 x daily - 1 sets - 10 reps  - Gentle Levator Scapulae Stretch  - 2 x daily - 2 sets - 30 seconds hold  - Doorway Pec Stretch at 90 Degrees Abduction  - 2 x daily - 2 reps - 30 sec hold  - Seated Scapular Retraction  - 2 x daily - 1 sets - 10 reps - 5 seconds hold  - Prone Ts  - 1 x daily - 1 sets - 10 reps  - Prone Scapular Slide with Shoulder Extension  - 1 x daily - 1 sets - 10 reps  - Shoulder External Rotation and Scapular Retraction with Resistance  - 1 x daily - 1 sets - 10 reps

## 2024-01-02 ENCOUNTER — OFFICE VISIT (OUTPATIENT)
Dept: PHYSICAL THERAPY | Facility: CLINIC | Age: 39
End: 2024-01-02
Payer: COMMERCIAL

## 2024-01-02 DIAGNOSIS — M54.2 NECK PAIN ON LEFT SIDE: ICD-10-CM

## 2024-01-02 DIAGNOSIS — M50.322 OTHER CERVICAL DISC DEGENERATION AT C5-C6 LEVEL: Primary | ICD-10-CM

## 2024-01-02 PROCEDURE — 97110 THERAPEUTIC EXERCISES: CPT

## 2024-01-02 NOTE — PROGRESS NOTES
Daily Note     Today's date: 2024  Patient name: Yue Harris  : 1985  MRN: [id number]  Referring provider: Dereck Milligan MD  Dx:   Encounter Diagnosis     ICD-10-CM    1. Other cervical disc degeneration at C5-C6 level  M50.322       2. Neck pain on left side  M54.2                      Subjective: Pt states that she is doing well today. States that she deep cleaned her bathroom yesterday and had minimal soreness afterward. She did have a headache later that night, however. States that a month and a half ago her symptom irritability was much more severe after cleaning. Hip pain has improved; minimal low back pain remains.     Objective: See treatment diary below    Mechanical Assessment:  Pretest symptoms = 2-3/10 low back   REIL 1 x 10 = 2-3/10     Assessment: Pt with NE following REIL, will benefit from further mechanical assessment in upcoming sessions. Pt able to self correct posture with therapeutic exercises today. Pt with report of minimal soreness in periscapular musculature and L proximal upper trap at completion of session with exercise progressions. Tolerated treatment well. Patient would benefit from continued PT.       Plan: Add light resistance lateral raise next session. Continue per plan of care.  Progress treatment as tolerated.       Precautions:   History reviewed. No pertinent past medical history.  History reviewed. No pertinent surgical history.      SOC: 2023  FOTO: 2023  POC Expiration: 2023  Daily Treatment Log  Date: 2023 Next session   2023   Visit#/ auth: 6 7    5  FOTO   Objective Measures         UE nerve tension         UE reflexes        Pelvic alignment/ leg length     KE: No gross LLD noted; R ASIS slightly superior to L on initial assessment in supine; ASIS equal bilaterally following pelvic shot gun   Mechanical assessment  KE; see objective      Manuals      5'   Suboccipital release         STM UT         Nerve glides        "   Pelvic shot gun (MET)      KE   Neuro Re-Ed                                                                               Ther Ex 40' 40'    40'   Chin tuck 5\" x 5 vs YTB (X2) 5\" x 5 vs YTB seated    5\" x 5 vs YTB standing       Chin tuck w/ self OP     Chin tuck w/ self op 5\" and cervical ext 1 x 10   Chin tuck w/ cervical extension         Cervical/ thoracic extension         UT stretch 30\" x 4 R/L 30\" x 2 R/L   30\" x 2 R/L   Cervical side bend 1 x 5 R/L  1 x 5 R/L      Cervical rotation AROM  1 x 5 R/L      Levator stretch 30\" x 2 R/L 30\" x 2 R/L   30\" x 2 R/L   SNAG 5\" x 5 R/L 5\" x 5 R/L   10\" x 3 R/L   Scapular retraction 5\" x 10  5\" x 10       Scap set w/ low row 2 x 10 yellow tubing  1 x 10 red tubing       Scap set w/ modified lat pull  1 x 10 uni lat pull down @ [place] 2.5# 2 x 10 uni lat pull down @ [place] 2.5#   1 x 10 w/ 2\" hold   Full abd arch AROM 1 x 10 B/L w/ half foam along thoracic spine in standing 1 x 10 B/L w/ half foam along thoracic spine in standing   1 x 10 B/L w/ half foam along thoracic spine in standing   Full flex arch AROM 1 x 10 B/L w/ half foam along thoracic spine in standing 1 x 10 B/L w/ half foam along thoracic spine in standing   1 x 10 B/L w/ half foam along thoracic spine in standing   Shoulder ER AROM Standing against wall 1 x 10 B/L RTB    1 x 10 B/L w/ RTB   Cookie sheets     1 x 10 YTB   Doorway pec stretch 30\" x 3 20\" x 3    30\" x 2   Supine serratus punch 1 x 10 B/L 1 x 10 B/L      Prone I, Y, T  T uni 1 x 10 I B/L 1 x 10    T uni 1 x 10 R/L    Y uni 1 x 10 R/L    I B/L 2 x 10   T uni 2 x 10  Y B/L 1 x 10   EDUCATION: Pathology, review of impairements, prognosis, activity modification, POC, and HEP HEP updated and reviewed    Updated and reviewed   Ther Activity                             Gait Training                             Modalities                             HEP:   Access Code: [email or web address]  URL: [email or web address]  Date: 12/28/2023  Prepared by: " Chrissie English    Exercises  - Seated Cervical Extension AROM  - 4-5 x daily - 1 sets - 10 reps  - Gentle Levator Scapulae Stretch  - 2 x daily - 2 sets - 30 seconds hold  - Doorway Pec Stretch at 90 Degrees Abduction  - 2 x daily - 2 reps - 30 sec hold  - Seated Scapular Retraction  - 2 x daily - 1 sets - 10 reps - 5 seconds hold  - Prone Ts  - 1 x daily - 1 sets - 10 reps  - Prone Scapular Slide with Shoulder Extension  - 1 x daily - 1 sets - 10 reps  - Shoulder External Rotation and Scapular Retraction with Resistance  - 1 x daily - 1 sets - 10 reps

## 2024-01-04 ENCOUNTER — OFFICE VISIT (OUTPATIENT)
Dept: PHYSICAL THERAPY | Facility: CLINIC | Age: 39
End: 2024-01-04
Payer: COMMERCIAL

## 2024-01-04 DIAGNOSIS — M50.322 OTHER CERVICAL DISC DEGENERATION AT C5-C6 LEVEL: Primary | ICD-10-CM

## 2024-01-04 DIAGNOSIS — M54.2 NECK PAIN ON LEFT SIDE: ICD-10-CM

## 2024-01-04 PROCEDURE — 97110 THERAPEUTIC EXERCISES: CPT

## 2024-01-04 NOTE — PROGRESS NOTES
Daily Note     Today's date: 2024  Patient name: Yue Harris  : 1985  MRN: [id number]  Referring provider: Dereck Milligan MD  Dx:   Encounter Diagnosis     ICD-10-CM    1. Other cervical disc degeneration at C5-C6 level  M50.322       2. Neck pain on left side  M54.2                      Subjective: 5-6/10 after leaving her session last visit and up to 7/10 by the end of the day working on the computer. This morning her kids spilled milk all over so she had some extra cleaning that did bother her neck maybe 2-3/10, she did some of her stretches and this helped her, on arrival to session 1/10 pain in neck.       Objective: See treatment diary below      Assessment: Tolerated treatment well. Pt dem radicular symptoms into B/L arms with manual SB UT stretching. Pt advised that we want to avoid radicular symptoms quinton when working into the lateral planes. Pt to trial cerv retact w/ ext/ thoracic ext w/ neck support which had been helpful in the past and not rely solely on her UT stretching for relief as this causes radicular symptoms more so than not. Pt advised to monitor her symptoms with the cerv/thor ext quinton those into her arms with her stretching. Pt to cont with her postural TE in HEP but to hold on Y's to avoid over working UT. Patient would benefit from continued PT      Plan: Continue per plan of care.      Precautions:   History reviewed. No pertinent past medical history.  History reviewed. No pertinent surgical history.      SOC: 2023  FOTO: 2023  POC Expiration: 2023  Daily Treatment Log  Date: 2023   Visit#/ auth: 6 7 8   5  FOTO   Objective Measures         UE nerve tension         UE reflexes        Pelvic alignment/ leg length     KE: No gross LLD noted; R ASIS slightly superior to L on initial assessment in supine; ASIS equal bilaterally following pelvic shot gun   Mechanical assessment  KE; see objective      Manuals   5'    5'  "  Suboccipital release         STM UT   B/L cerv/UT RR       Nerve glides          Pelvic shot gun (MET)      KE   Neuro Re-Ed                                                                               Ther Ex 40' 40' 35'    40'   Chin tuck 5\" x 5 vs YTB (X2) 5\" x 5 vs YTB seated    5\" x 5 vs YTB standing Cerv retract w/ ext 1x10       Chin tuck w/ self OP     Chin tuck w/ self op 5\" and cervical ext 1 x 10   Chin tuck w/ cervical extension         Cervical/ thoracic extension   Thoracic ext w/ neck support 1x10       UT stretch 30\" x 4 R/L 30\" x 2 R/L   30\" x 2 R/L   Cervical side bend 1 x 5 R/L  1 x 5 R/L      Cervical rotation AROM  1 x 5 R/L      Levator stretch 30\" x 2 R/L 30\" x 2 R/L   30\" x 2 R/L   SNAG 5\" x 5 R/L 5\" x 5 R/L   10\" x 3 R/L   Scapular retraction 5\" x 10  5\" x 10       Scap set w/ low row 2 x 10 yellow tubing  1 x 10 red tubing  Red tubing 2x10      Scap set w/ modified lat pull  1 x 10 uni lat pull down @ [place] 2.5# 2 x 10 uni lat pull down @ [place] 2.5#   1 x 10 w/ 2\" hold   Full abd arch AROM 1 x 10 B/L w/ half foam along thoracic spine in standing 1 x 10 B/L w/ half foam along thoracic spine in standing   1 x 10 B/L w/ half foam along thoracic spine in standing   Full flex arch AROM 1 x 10 B/L w/ half foam along thoracic spine in standing 1 x 10 B/L w/ half foam along thoracic spine in standing   1 x 10 B/L w/ half foam along thoracic spine in standing   Shoulder ER AROM Standing against wall 1 x 10 B/L RTB    1 x 10 B/L w/ RTB   Cookie sheets     1 x 10 YTB   Doorway pec stretch 30\" x 3 20\" x 3  20\"x3 w/ MH   30\" x 2   Supine serratus punch 1 x 10 B/L 1 x 10 B/L      Prone I, Y, T  T uni 1 x 10 I B/L 1 x 10    T uni 1 x 10 R/L    Y uni 1 x 10 R/L  B/L shoulder ext red tubing 2x10   I B/L 2 x 10   T uni 2 x 10  Y B/L 1 x 10   EDUCATION: Pathology, review of impairements, prognosis, activity modification, POC, and HEP HEP updated and reviewed    Updated and reviewed   Ther Activity       "                       Gait Training                             Modalities             MH w/ postural TE skin intact pre/post                 HEP:   Access Code: [email or web address]  URL: [email or web address]  Date: 12/28/2023  Prepared by: Chrissie Lugo    Exercises  - Seated Cervical Extension AROM  - 4-5 x daily - 1 sets - 10 reps  - Gentle Levator Scapulae Stretch  - 2 x daily - 2 sets - 30 seconds hold  - Doorway Pec Stretch at 90 Degrees Abduction  - 2 x daily - 2 reps - 30 sec hold  - Seated Scapular Retraction  - 2 x daily - 1 sets - 10 reps - 5 seconds hold  - Prone Ts  - 1 x daily - 1 sets - 10 reps  - Prone Scapular Slide with Shoulder Extension  - 1 x daily - 1 sets - 10 reps  - Shoulder External Rotation and Scapular Retraction with Resistance  - 1 x daily - 1 sets - 10 reps

## 2024-01-11 ENCOUNTER — EVALUATION (OUTPATIENT)
Dept: PHYSICAL THERAPY | Facility: CLINIC | Age: 39
End: 2024-01-11
Payer: COMMERCIAL

## 2024-01-11 DIAGNOSIS — M50.322 OTHER CERVICAL DISC DEGENERATION AT C5-C6 LEVEL: Primary | ICD-10-CM

## 2024-01-11 DIAGNOSIS — M54.2 NECK PAIN ON LEFT SIDE: ICD-10-CM

## 2024-01-11 PROCEDURE — 97110 THERAPEUTIC EXERCISES: CPT

## 2024-01-11 NOTE — PROGRESS NOTES
PT Re-Evaluation     Today's date: 2024  Patient name: Yue Harris  : 1985  MRN: [id number]  Referring provider: Dereck Milligan MD  Dx:   Encounter Diagnosis     ICD-10-CM    1. Other cervical disc degeneration at C5-C6 level  M50.322       2. Neck pain on left side  M54.2                      Assessment  Assessment details: 2023: Yue Harris is a 38 y.o. L hand dominant female who presents with chief complaint of reoccurring L neck pain. Pt presents with limited cervical AROM, positive cervical distraction test, LUE weakness, bilateral middle/ lower trap weakness, bilateral latissimus dorsi weakness, and limited L shoulder AROM. Symptoms respond with repeated cervical retraction with cervical and thoracic extension upon mechanical assessment to determine directional preference. Due to presenting body impairments, pt is unable to maintain prolonged positions including sitting for greater than 1 hour or forward reaching of bilateral UE affecting her ability to sit in [employer], clean, and engage in recreational activities. Pt educated in pathology, review of impairements, prognosis, activity modification, POC, and HEP. Pt will benefit from skilled physical therapy to address deficits in range of motion, strength and function and return to PLOF by reaching short and long term goals.    2024: Yue Harris is a 38 y.o. female with complaint of L neck pain. Pt demonstrates improved cervical spine AROM and UE myotome strength bilaterally. Despite improvements with PT, pt demonstrates middle/ lower trap and latissimus dorsi weakness bilaterally, impaired cervical spine AROM,  LUE AROM deficits, and LUE C7 myotome weakness. Symptoms no longer responding to repeated cervical retraction/ extension. Patients body impairments are limiting her tolerance to static positions. Pt is limited in her ability to sit or reach arms out in front affecting her participation in cleaning tasks, playing board  games, working on computer, and sitting for long car rides. Pt educated in progress with PT, remaining impairments, activity modification, POC, and HEP. Pt will benefit from skilled physical therapy to address deficits in range of motion, strength and function and return to PLOF by reaching short and long term goals. Plan to focus on strengthening of periscapular musculature in upcoming session to prevent over utilization of upper trapezius.   Impairments: abnormal or restricted ROM, activity intolerance, lacks appropriate home exercise program, pain with function, poor posture  and poor body mechanics  Understanding of Dx/Px/POC: good   Prognosis: good    Goals  Short Tem Goals to be met in 4 weeks (target date 1/3/2024)  1. Pt to be independent w/ HEP in order to demonstrate active participation in recovery. (MET 1/11/2024)  2. Reduce c/o pain to 0-3/10 with activity and prolonged positions in order to attend [employer]/ participate in cleaning activities. (Progressing 1/11/2024)  3. Restore cervical AROM to nil limitation w/o pain in order to return to driving w/out compensatory trunk rotation. (Progressing 1/11/2024)  4. Pt will improve L shoulder flex AROM to be within 5 degrees R in order to reach items off top shelves. (Ongoing 1/11/2024)    Long Term Goal to be met in 12 weeks (target date 2/28/2024)  1. Pt will demonstrate improved L shoulder flex AROM by 10 degrees in order to lift clean w/ dominant UE. (Ongoing 1/11/2024)  2. Pt will be able to hold bilateral UE at 90 degrees for 60 seconds w/ correct posture to demonstrate improved muscular endurance necessary for mopping and scrubbing tub. (Progressing 1/11/2024)  3. Pt will improve B/L middle/ lower trap and latissimus dorsi strength to 5/5 to allow lifting and carrying necessary for improved posture w/ functional activities. (Progressing 1/11/2024)  4. Pt to achieve full symptom prevention/resolution via HEP. (Ongoing 1/11/2024)    Plan  Plan details: HEP  development and progression, monitor patients adherence to activity modification to ensure adequate healing time/ recovery. Implement stretching, A/AA/PROM, joint mobilizations, posture education, STM/MI as needed to reduce muscle tension, muscle reeducation. Progress strengthening; improve pt's tolerance to UE WB and functional activity. POC discussed and agreed upon with patient.   Planned modality interventions: traction, unattended electrical stimulation and thermotherapy: hydrocollator packs  Planned therapy interventions: joint mobilization, manual therapy, abdominal trunk stabilization, patient education, postural training, stretching, transfer training, home exercise program and strengthening  Frequency: 2-3x/week.  Plan of Care beginning date: 12/6/2023  Plan of Care expiration date: 2/28/2024  Treatment plan discussed with: patient      Subjective Evaluation    History of Present Illness  Mechanism of injury: 12/6/20243 Yue is a 38 y.o. female who returns to PT for evaluation with chief complaint of L neck pain. Pt states that has received PT treatment previously which offered relief. States that the pain she is experiencing is due to a reoccurring injury. Pain returned in May of 2023 while getting down to clean her tub; states that she re-aggravated her neck. She experienced bilateral neck pain and numbness/tingling into her hands. She restarted her home PT exercises and began to experience improvement in symptoms. States that she was doing well into August. However 3 months ago she states that even her home PT exercises, ice, home traction unit, Tylenol and Advil did not provide relief. States that she would sit in [employer] for 1 hour and have to return home immediately afterward to lie down. States that her visit w/ MD for current pain was 1 month ago; due to busy schedule she was unable to attend PT initially. States that MD provided muscle relaxer which has improved her symptoms and left cervical  "spine muscle spasm. Reports that numbness and tingling has resolved. She did obtain x-ray imaging indicating increase in C5-C6 degeneration since previous imaging. Exacerbating activities include sitting, mopping the floor, scrubbing bath, and high impact activity (pt uses example of hiking). Pt has 3 children; states that two of her children have ADHD, therefore for her role as a mother is highly active. States that her ROM is limited with driving. Lifting overhead is not an issue however, pt notices that she utilizes compensatory movements. Pt works a part time psychologist 2 days/ week.     2024: Pt states that on  she was playing a puzzle which caused an increase in her pain. Pain continues to present on L side of neck. Driving her son to doctors appt 40 min away and sitting in work chair bothered her as sitting for prolonged periods continue to bother her. Tuesday she had a headache and neck pain; attributes headache to being hormone related. Felt like stretches weren't doing anything. Yesterday was better; was at home, home schooling. She feels better when she switches her activity t/o day. Pain no longer goes into the hand. Occasionally would feel \"twinge into L arm with L cervical lateral flexion. No longer requires supine rest after [employer].   Patient Goals  Patient goals for therapy: decreased pain, increased strength, independence with ADLs/IADLs, return to sport/leisure activities and increased motion  Patient goal: Return to ADLs w/out limitation  Pain  Current pain ratin  At best pain ratin  At worst pain ratin  Location: L upper trapezius; primary pain at level of C4/C5/C6  Quality: dull ache, sharp and burning (Dull/ constant)  Aggravating factors: sitting and lifting (Prolonged positions, bending forward)    Hand dominance: left      Diagnostic Tests  X-ray: abnormal  Treatments  Previous treatment: medication and physical therapy (FLEXERIL PRN)  Current treatment: physical " therapy    Objective  SPECIAL TESTS:      2023  Alar ligament:    (-)  NT  Sharp Kylie:    NT  NT  Spurling's (sagittal plane):  (-)  NT  Distraction:    (+)  NT    2023  Pt w/ report of intermittent headaches. Pt denies dizziness and diplopia currently. Pt denies nasea/vomiting. Pt states that in August when symptom irritability was high she experiences brief episodic dizziness.     2024  Pt denies headaches, dizziness, nausea, and vomiting.     DERMATOMAL TESTING     2023     RIGHT  LEFT  RIGHT  LEFT     (Light touch) (Light touch)   C1 top of head:  intact  Intact  NT  NT  C2 behind ear:  intact  intact  NT  NT  C3 lat/ant neck:  intact  intact  NT  NT  C4  upper trap:  intact  intact  NT  NT  C5 lat arm:   intact  intact  NT  NT  C6 thumb:   intact  intact  NT  NT  C7 middle finger:  intact  intact  NT  NT  C8 5th finger:   intact  intact  NT  NT    MYOTOMAL TESTING     2023     R  L  R  L  C4 Shru/5  5/5  5/5  5/5  C5 shld abd:   4+/5  4+/5  5/5  5/5  C6 bicep:    4/5  4+/5  5/5  5/5  C7 tricep:   5/5  4/5  5/5  4/5  C8 finger flexion:  5/5  4/5   5/5  5/5    MMT:     2023     R  L  R  L   Middle trap  3+/5  3+/5  3+/5  3+/5   Lower trap/lat   3+/5  3+/5  4-/5  3+/5    DTR's   2023  Biceps (C5-6):  NT  2+ B/L  Brachioradialis (C6):  NT  2+ B/L  Triceps (C7):   NT  2+ B/L    UE AROM standin2023     Right  Left  Right  Left  Shoulder flexion: 180  180  180  180  Shoulder abduction: 180  170  180  170  Shoulder ER @90 95  90  92  90  Funct IR behind back T1  Spine of scap C7  T1      MOBILITY ASSESSMENT:   2023  Cervical AROM limitation  Flexion      nil cordero*  Min cordero            Extension   min cordero  Nil cordero            R rotation    min cordero  Min cordero             L rotation    min cordero  Nil cordero * UT             R  "Sidebend   min cordero  Nil cordero *             L sidebend   min cordero   Nil cordero *              Retraction     nil cordero  Nil cordero             Protraction    nil cordero  Nil cordero    UPPER LIMB TENSION TESTS:  12/6/2023:    Median, Ulnar and Radial nerve tension tests R = NT  Median, Ulnar and Radial nerve Tension tests L = NT    12/6/2023:    Median, Ulnar and Radial nerve tension tests R = negative  Median, Ulnar and Radial nerve Tension tests L = unlar nerve tesnion +    MECHANICAL ASSESSMENT  12/6/2023  Pretest symptoms include 4/10 pain at level of C4 on L, along length of UT  Repeated cervical retraction w/ OP 5\" x 10 = increase during, NE at completion  Repeated cervical retraction w/OP and extension 1 x 10 = NE  Repeated cervical retraction w/OP, cervical and thoracic extension 1 x 7 (d/c d/t pain) = painful during, centralized afterward, pt report of min improvement in pain, no pain scale provided.    1/11/2024  Pretest symptoms include 3-4/10 pain at level of C4 on L, along length of UT primarily @ transverse process C5-C7   Repeated cervical retraction and extension w/ OP @ C7 transverse process = 4/10 pain location unchanged   Repeated cervical retraction off edge w/ manual assist into cervical extension = 4/10 pain, pain into L medial scapular border    Palpation:  12/6/2023: Pt denies TTP along transverse process of C-spine bilaterally. Pt w/ increased palpable tension of L upper trapezius despite pt report of no pain.  1/11/2024: Pt denies TTP along C3-C7 spinous process.     FUNCTION:   12/6/2023:   Pt reports limited tolerance to sitting; 1 hr at [employer] needs to return home and lie down  Cleaning task: unable to mob/ scrub floor  1/11/2024  Pt reports limited tolerance to sitting; no longer needs supine rest after 1 hr of [employer] however  Cleaning task: was able to return to cleaning initially after start of PT but as of recent has had pain w/ cleaning tasks.        Precautions:   History reviewed. No pertinent past " "medical history.  History reviewed. No pertinent surgical history.    SOC: 12/6/2023  FOTO: 12/26/2023  POC Expiration: 2/28/2023  Daily Treatment Log  Date: 12/28/2023 1/2/2024 1/4/2024 1/11/2024    Visit#/ auth: 6 7 8 9    Objective Measures        UE nerve tension        UE reflexes        Pelvic alignment/ leg length        Mechanical assessment  KE; see objective      Manuals   5'      Suboccipital release        STM UT   B/L cerv/UT RR      Nerve glides         Pelvic shot gun (MET)        Neuro Re-Ed                                                                       Ther Ex 40' 40' 35'  45'    Chin tuck 5\" x 5 vs YTB (X2) 5\" x 5 vs YTB seated    5\" x 5 vs YTB standing Cerv retract w/ ext 1x10      Chin tuck w/ self OP        Chin tuck w/ cervical extension        Cervical/ thoracic extension   Thoracic ext w/ neck support 1x10      UT stretch 30\" x 4 R/L 30\" x 2 R/L      Cervical side bend 1 x 5 R/L  1 x 5 R/L      Cervical rotation AROM  1 x 5 R/L      Levator stretch 30\" x 2 R/L 30\" x 2 R/L      SNAG 5\" x 5 R/L 5\" x 5 R/L      Scapular retraction 5\" x 10  5\" x 10       Scap set w/ low row 2 x 10 yellow tubing  1 x 10 red tubing  Red tubing 2x10      Scap set w/ modified lat pull  1 x 10 uni lat pull down @ [place] 2.5# 2 x 10 uni lat pull down @ [place] 2.5#      Full abd arch AROM 1 x 10 B/L w/ half foam along thoracic spine in standing 1 x 10 B/L w/ half foam along thoracic spine in standing      Full flex arch AROM 1 x 10 B/L w/ half foam along thoracic spine in standing 1 x 10 B/L w/ half foam along thoracic spine in standing      Shoulder ER AROM Standing against wall 1 x 10 B/L RTB       Cookie sheets        Doorway pec stretch 30\" x 3 20\" x 3  20\"x3 w/ MH      Supine serratus punch 1 x 10 B/L 1 x 10 B/L      Prone I, Y, T  T uni 1 x 10 I B/L 1 x 10    T uni 1 x 10 R/L    Y uni 1 x 10 R/L  B/L shoulder ext red tubing 2x10      Objective measures: cervical/ shoulder AROM, UE strength, UE nerve " tension, mechanical assessment    KE    EDUCATION: Pathology, review of impairements, prognosis, activity modification, POC, and HEP HEP updated and reviewed   KE: HEP updated and reviewed    Ther Activity                          Gait Training                          Modalities            MH w/ postural TE skin intact pre/post               HEP:   Access Code: [email or web address]  URL: [email or web address]  Date: 12/28/2023  Prepared by: Chrissie Lugo    Exercises  - Seated Cervical Extension AROM  - 4-5 x daily - 1 sets - 10 reps  - Gentle Levator Scapulae Stretch  - 2 x daily - 2 sets - 30 seconds hold  - Doorway Pec Stretch at 90 Degrees Abduction  - 2 x daily - 2 reps - 30 sec hold  - Seated Scapular Retraction  - 2 x daily - 1 sets - 10 reps - 5 seconds hold  - Prone Ts  - 1 x daily - 1 sets - 10 reps  - Prone Scapular Slide with Shoulder Extension  - 1 x daily - 1 sets - 10 reps  - Shoulder External Rotation and Scapular Retraction with Resistance  - 1 x daily - 1 sets - 10 reps

## 2024-01-15 ENCOUNTER — OFFICE VISIT (OUTPATIENT)
Dept: PHYSICAL THERAPY | Facility: CLINIC | Age: 39
End: 2024-01-15
Payer: COMMERCIAL

## 2024-01-15 DIAGNOSIS — M54.2 NECK PAIN ON LEFT SIDE: ICD-10-CM

## 2024-01-15 DIAGNOSIS — M50.322 OTHER CERVICAL DISC DEGENERATION AT C5-C6 LEVEL: Primary | ICD-10-CM

## 2024-01-15 PROCEDURE — 97110 THERAPEUTIC EXERCISES: CPT | Performed by: PHYSICAL THERAPIST

## 2024-01-15 PROCEDURE — 97140 MANUAL THERAPY 1/> REGIONS: CPT | Performed by: PHYSICAL THERAPIST

## 2024-01-15 NOTE — PROGRESS NOTES
"Daily Note     Today's date: 1/15/2024  Patient name: Yue Harris  : 1985  MRN: [id number]  Referring provider: Dereck Milligan MD  Dx:   Encounter Diagnosis     ICD-10-CM    1. Other cervical disc degeneration at C5-C6 level  M50.322       2. Neck pain on left side  M54.2                      Subjective: Pt reports constant 3-4/10 L cerv/upper trap 3-4/10 this morning upon arrival.      Objective: See treatment diary below  Rep L cerv SB w/ self OP 2\"x10 = dec/NB  Rep L cerv SB w/ clinician OP 1x10 = dec/NB  Rep L cerv SB w/ clinician OP 10\"x5 = dec/    Assessment: Tolerated treatment well and appears to have directional preference of left cerv SB today . Patient  will benefit from exploration of potential DP, and was asked to continue w/ L cerv SB w/ self OP w/ stab w/ towel at C6 and to hold off on stretch in opposite direction. She may go back to B/L stretches if this proves not to be helpful, but to continue to hold off on contralateral stretch if SB w/ self OP w/ hold (to L) is helpful. Pt expresses understanding.      Plan:  Continue scap stab and exploration for DP.     Precautions:   History reviewed. No pertinent past medical history.  History reviewed. No pertinent surgical history.    SOC: 2023  FOTO: 2023  POC Expiration: 2023  Daily Treatment Log  Date: 2023 2024 2024 2024 1/15/2024   Visit#/ auth: 6 7 8 9 10   Objective Measures                UE nerve tension        UE reflexes        Pelvic alignment/ leg length        Mechanical assessment  KE; see objective      Manuals   5'      Suboccipital release        STM UT   B/L cerv/UT RR   Supine B/L levator TP release   L cerv SB w/ clinician OP     C6 15\"x5; 2x   Nerve glides         Pelvic shot gun (MET)        Neuro Re-Ed                         Ther Ex 40' 40' 35'  45' 35'   Chin tuck 5\" x 5 vs YTB (X2) 5\" x 5 vs YTB seated    5\" x 5 vs YTB standing Cerv retract w/ ext 1x10      Chin tuck w/ self OP    " "    Chin tuck w/ cervical extension        Cervical/ thoracic extension   Thoracic ext w/ neck support 1x10      UT stretch 30\" x 4 R/L 30\" x 2 R/L      B/L UE wall slides     2\"x10   Cervical side bend 1 x 5 R/L  1 x 5 R/L   To L w/ self OP 2\" 2x10   Cervical rotation AROM  1 x 5 R/L      Levator stretch 30\" x 2 R/L 30\" x 2 R/L      SNAG 5\" x 5 R/L 5\" x 5 R/L      Scapular retraction 5\" x 10  5\" x 10       Scap set w/ low row 2 x 10 yellow tubing  1 x 10 red tubing  Red tubing 2x10   Grn tubing 2x10   Scap set w/ modified lat pull  1 x 10 uni lat pull down @ [place] 2.5# 2 x 10 uni lat pull down @ [place] 2.5#      Full abd arch AROM 1 x 10 B/L w/ half foam along thoracic spine in standing 1 x 10 B/L w/ half foam along thoracic spine in standing      Full flex arch AROM 1 x 10 B/L w/ half foam along thoracic spine in standing 1 x 10 B/L w/ half foam along thoracic spine in standing      Shoulder ER AROM Standing against wall 1 x 10 B/L RTB    RTB against wall 1x10   Supermans at wall for LT     5\"x10   Doorway pec stretch 30\" x 3 20\" x 3  20\"x3 w/ MH      Supine serratus punch 1 x 10 B/L 1 x 10 B/L      Prone I, Y, T  T uni 1 x 10 I B/L 1 x 10    T uni 1 x 10 R/L    Y uni 1 x 10 R/L  B/L shoulder ext red tubing 2x10   Prone \"I\"  & \"W'; 2\" 2x10 each   Objective measures: cervical/ shoulder AROM, UE strength, UE nerve tension, mechanical assessment    KE    EDUCATION: Pathology, review of impairements, prognosis, activity modification, POC, and HEP HEP updated and reviewed   KE: HEP updated and reviewed See obj - trial L SB w/ op at home; hold contralat stretches   Ther Activity                          Gait Training                          Modalities            MH w/ postural TE skin intact pre/post               HEP:   Access Code: [email or web address]  URL: [email or web address]  Date: 12/28/2023  Prepared by: Chrissie Lugo    Exercises  - Seated Cervical Extension AROM  - 4-5 x daily - 1 sets - 10 reps  - " Gentle Levator Scapulae Stretch  - 2 x daily - 2 sets - 30 seconds hold  - Doorway Pec Stretch at 90 Degrees Abduction  - 2 x daily - 2 reps - 30 sec hold  - Seated Scapular Retraction  - 2 x daily - 1 sets - 10 reps - 5 seconds hold  - Prone Ts  - 1 x daily - 1 sets - 10 reps  - Prone Scapular Slide with Shoulder Extension  - 1 x daily - 1 sets - 10 reps  - Shoulder External Rotation and Scapular Retraction with Resistance  - 1 x daily - 1 sets - 10 reps

## 2024-01-18 ENCOUNTER — OFFICE VISIT (OUTPATIENT)
Dept: PHYSICAL THERAPY | Facility: CLINIC | Age: 39
End: 2024-01-18
Payer: COMMERCIAL

## 2024-01-18 DIAGNOSIS — M50.322 OTHER CERVICAL DISC DEGENERATION AT C5-C6 LEVEL: Primary | ICD-10-CM

## 2024-01-18 DIAGNOSIS — M54.2 NECK PAIN ON LEFT SIDE: ICD-10-CM

## 2024-01-18 PROCEDURE — 97110 THERAPEUTIC EXERCISES: CPT

## 2024-01-18 NOTE — PROGRESS NOTES
"Daily Note     Today's date: 2024  Patient name: Yue Harris  : 1985  MRN: [id number]  Referring provider: Dereck Milligan MD  Dx:   Encounter Diagnosis     ICD-10-CM    1. Other cervical disc degeneration at C5-C6 level  M50.322       2. Neck pain on left side  M54.2                      Subjective: Pt states that she is not feeling bad this morning. She states that she has had headaches over the past few days. Pt states that it is typical for her to have occasional bout of headaches. However, she describes headache to stem from bilateral upper trap and travel just posterior to ear. Pt experienced moment of numbness and tingling into left hand while lying in bed with head back and bent to the left side; n/t resolved right away after moving out of this position. States that she did sleep terribly last night. Pain on arrival is 2-3/10.     Objective: See treatment diary below      Assessment: Pt able to tolerate exercise progression today including resisted exercises challenging rotator cuff. Pain with front raise on left; pt instructed to perform w/in pain-free ROM for scapular stabilization/ anterior delt synergy neuro-re ed. HEP updated as pt will be away from PT next week d/t outside factors. Tolerated treatment well. Patient would benefit from continued PT.       Plan: Continue per plan of care.  Progress treatment as tolerated.       Precautions:   History reviewed. No pertinent past medical history.  History reviewed. No pertinent surgical history.    SOC: 2023  FOTO: 2023  POC Expiration: 2023  Daily Treatment Log  Date: 2024  2024 2024 1/15/2024   Visit#/ auth: 11  8 9  RE 10   Objective Measures                UE nerve tension        UE reflexes        Pelvic alignment/ leg length        Mechanical assessment        Manuals 5'  5'      Suboccipital release        STM UT   B/L cerv/UT RR   Supine B/L levator TP release   L cerv SB w/ clinician OP C6 15\"x5    C6 " "15\"x5; 2x   Nerve glides         Pelvic shot gun (MET)        Neuro Re-Ed                         Ther Ex 40'  35'  45' 35'   Chin tuck   Cerv retract w/ ext 1x10      Cervical/ thoracic extension   Thoracic ext w/ neck support 1x10      B/L UE wall slides On physio ball 2\" x 10     2\"x10   Cervical side bend To L w/ self OP 2\" 2 x 10    1 x 10 L side bend    To L w/ self OP 2\" 2x10   Scap set w/ low row Grn tubing 2x10  Red tubing 2x10   Grn tubing 2x10   Front raise w/ scap set  1 x 10 B/L pain- free       Shoulder ER AROM 3# ER @ [place] R/L 1 x 10    RTB against wall 1x10   Shoulder IR 3# ER @ [place] R/L 1 x 10       Supermans at wall for LT     5\"x10   Doorway pec stretch   20\"x3 w/ MH      Prone I, Y, T  1 x 10 ea. B/L   B/L shoulder ext red tubing 2x10   Prone \"I\"  & \"W'; 2\" 2x10 each   Objective measures: cervical/ shoulder AROM, UE strength, UE nerve tension, mechanical assessment    KE    EDUCATION: Pathology, review of impairements, prognosis, activity modification, POC, and HEP    KE: HEP updated and reviewed See obj - trial L SB w/ op at home; hold contralat stretches   Ther Activity                          Gait Training                          Modalities            MH w/ postural TE skin intact pre/post               HEP:   Access Code: [email or web address]  URL: [email or web address]  Date: 01/18/2024  Prepared by: Chrissie Lugo    Exercises  - Prone Ts  - 1 x daily - 2 sets - 10 reps  - Prone Scapular Slide with Shoulder Extension  - 1 x daily - 2 sets - 10 reps  - Shoulder External Rotation and Scapular Retraction with Resistance  - 1 x daily - 2 sets - 10 reps  - Prone Single Arm Shoulder Y  - 1 x daily - 2 sets - 10 reps  - Shoulder Flexion Wall Slide with Towel  - 1 x daily - 2 sets - 5 reps         "

## 2024-01-30 ENCOUNTER — OFFICE VISIT (OUTPATIENT)
Dept: PHYSICAL THERAPY | Facility: CLINIC | Age: 39
End: 2024-01-30
Payer: COMMERCIAL

## 2024-01-30 DIAGNOSIS — M50.322 OTHER CERVICAL DISC DEGENERATION AT C5-C6 LEVEL: Primary | ICD-10-CM

## 2024-01-30 DIAGNOSIS — M54.2 NECK PAIN ON LEFT SIDE: ICD-10-CM

## 2024-01-30 PROCEDURE — 97110 THERAPEUTIC EXERCISES: CPT

## 2024-01-30 PROCEDURE — 97112 NEUROMUSCULAR REEDUCATION: CPT

## 2024-01-30 NOTE — PROGRESS NOTES
"Daily Note     Today's date: 2024  Patient name: Yue Harris  : 1985  MRN: [id number]  Referring provider: Dereck Milligan MD  Dx:   Encounter Diagnosis     ICD-10-CM    1. Other cervical disc degeneration at C5-C6 level  M50.322       2. Neck pain on left side  M54.2                      Subjective: Pt states that her neck is feeling better today. She just returned from a week vacation. She has been doing cervical lateral flexion to left with a self over pressure. She has been moving the fulcrum along C4 to C7 and has found that she experiences greatest symptom relief with OP at location of familiar pain around C5. Pt states that the plane ride did not bother her neck much. She did experience sciatica in her leg that she was able to relieve with a stretch her  taught her. She did her exercises 3x out of the week. Pain rated as 1/10 at start of session.       Objective: See treatment diary below      Assessment: Pt educated on importance of posture with prolonged positions. Reviewed correct cervical, thoracic and lumbar posture w/ various activities including sitting and forward reaching. Pt with minimal report of pain in L shoulder at completion of resisted L shoulder IR/ ER; pain rated as 1-2/10. Tolerated treatment well. Patient would benefit from continued PT.       Plan: Continue per plan of care.  Progress treatment as tolerated.       Precautions:   History reviewed. No pertinent past medical history.  History reviewed. No pertinent surgical history.    SOC: 2023  FOTO: 2023  POC Expiration: 2023  Daily Treatment Log  Date: 2024 Next session  1/15/2024   Visit#/ auth: 11 12   10   Objective Measures                UE nerve tension        UE reflexes        Pelvic alignment/ leg length        Mechanical assessment        Manuals 5'       Suboccipital release        STM UT     Supine B/L levator TP release   L cerv SB w/ clinician OP C6 15\"x5    C6 15\"x5; " Group Topic: [facility] Activity Group    Date: 2/14/2022  Start Time: 1305  End Time: 1345  Facilitators: Tessy Peterson LPC, JESSICA Forbes SAC    Focus: Self-love by utilizing positive affirmations. Understanding positive affirmations and how to utilize positive affirmations within their lives.   Number in attendance: 13      Goals: To create a positive affirmation for themselves.   Handouts: Positive Affirmation art piece   Method: Group and Audio/Visual  Attendance: Present  Mood/Affect: Appropriate  Behavior/Socialization: Appropriate to group  Participation: Active  Overall Patient Response to Group: Appropriate to topic and Demonstrated insight  Individual Response to Group:   CT provided multiple examples of positive affirmations with the group including \"I'm a good mother\". CT asked questions from the facilitator of how to appropriately complete the group activity. CT actively participated in the group activity. CT provided positive words to peers about their positive affirmation projects. CT identified needing to not interrupt her peers while they worked on their positive affirmations. CT identified that she felt \"invigorated\" as she was able to identify new terms that she doesn't usually use towards herself.     Ability to Apply Content to Group: 5   OLIVIER Burgos           "2x   Nerve glides         Pelvic shot gun (MET)        Neuro Re-Ed  25'      Scapular retraction  5\" x 10 seated               Ther Ex 40' 10'   35'   Cervical/ thoracic extension        B/L UE wall slides On physio ball 2\" x 10  Towel slide to 90 deg 3 x 10    2\"x10   Cervical side bend To L w/ self OP 2\" 2 x 10    1 x 10 L side bend To L w/ self OP 2\" 2 x 10   To L w/ self OP 2\" 2x10   Scap set w/ low row Grn tubing 2x10 Scap set w/ sh ext to neutral @ [place] 2.5 lbs 1 x 10 B/L    Grn tubing 2x10   Front raise w/ scap set  1 x 10 B/L pain- free               Shoulder ER AROM 3# @ [place] R/L 1 x 10 3# @ [place] R/L 1 x 10   RTB against wall 1x10   Shoulder IR 3# @ [place] R/L 1 x 10 3# @ [place] R/L 1 x 10      Supermans at wall for LT     5\"x10   Doorway pec stretch  30\" x 3 B/L      Prone I, Y, T  1 x 10 ea. B/L  1 x 10 ea. R/L   Prone \"I\"  & \"W'; 2\" 2x10 each   Objective measures: cervical/ shoulder AROM, UE strength, UE nerve tension, mechanical assessment        EDUCATION: Pathology, review of impairements, prognosis, activity modification, POC, and HEP  Posture edu and importance of HEP compliance   See obj - trial L SB w/ op at home; hold contralat stretches   Ther Activity                          Gait Training                          Modalities                          HEP:   Access Code: [email or web address]  URL: [email or web address]  Date: 01/18/2024  Prepared by: Chrissie Lugo    Exercises  - Prone Ts  - 1 x daily - 2 sets - 10 reps  - Prone Scapular Slide with Shoulder Extension  - 1 x daily - 2 sets - 10 reps  - Shoulder External Rotation and Scapular Retraction with Resistance  - 1 x daily - 2 sets - 10 reps  - Prone Single Arm Shoulder Y  - 1 x daily - 2 sets - 10 reps  - Shoulder Flexion Wall Slide with Towel  - 1 x daily - 2 sets - 5 reps           "

## 2024-02-01 ENCOUNTER — OFFICE VISIT (OUTPATIENT)
Dept: PHYSICAL THERAPY | Facility: CLINIC | Age: 39
End: 2024-02-01
Payer: COMMERCIAL

## 2024-02-01 DIAGNOSIS — M54.2 NECK PAIN ON LEFT SIDE: ICD-10-CM

## 2024-02-01 DIAGNOSIS — M50.322 OTHER CERVICAL DISC DEGENERATION AT C5-C6 LEVEL: Primary | ICD-10-CM

## 2024-02-01 PROCEDURE — 97110 THERAPEUTIC EXERCISES: CPT

## 2024-02-01 PROCEDURE — 97112 NEUROMUSCULAR REEDUCATION: CPT

## 2024-02-01 NOTE — PROGRESS NOTES
"Daily Note     Today's date: 2024  Patient name: Yue Harris  : 1985  MRN: [id number]  Referring provider: Dereck Milligan MD  Dx:   Encounter Diagnosis     ICD-10-CM    1. Other cervical disc degeneration at C5-C6 level  M50.322       2. Neck pain on left side  M54.2                      Subjective: Pt states that she if feeling well today. She has been doing a lot of computer work over the past few days, she is surprised she is not sore. She is happy to be at PT as she feels she needs to move/ stretch.       Objective: See treatment diary below      Assessment: Pt able to complete shoulder ER/IR at [place] [facility] with minimal report of pain at completion which demonstrates progress since last session. Pt continues to require min cues for cervical spine postural correction throughout session. Unable to perform retro UBE d/t anterior shoulder rounding and min report of pain. She complains of min increase in L shoulder pain with exercise progressions today; L cervical flexion with self OP and clinician OP continues to provide relief. Tolerated treatment well. Patient would benefit from continued PT.       Plan: Continue per plan of care.  Progress treatment as tolerated.       Precautions:   History reviewed. No pertinent past medical history.  History reviewed. No pertinent surgical history.    SOC: 2023  FOTO: 2023  POC Expiration: 2023  Daily Treatment Log  Date: 2024 2024 2024  1/15/2024   Visit#/ auth: 11 12 13  10   Objective Measures                Manuals 5'  5'     STM UT     Supine B/L levator TP release   L cerv SB w/ clinician OP C6 15\"x5  C6 5\"x10  C6 15\"x5; 2x   Nerve glides         Pelvic shot gun (MET)        Neuro Re-Ed  25' 10' (exercises in TE section)     Scapular retraction  5\" x 10 seated               Ther Ex 40' 10' 30'  35'   Retro UBE   Unable to tolerate     B/L UE wall slides On physio ball 2\" x 10  Towel slide to 90 deg 3 x 10  Towel slide to 90 " "deg 2 x 10   2\"x10   Cervical side bend To L w/ self OP 2\" 2 x 10    1 x 10 L side bend To L w/ self OP 2\" 2 x 10 To L w/ self OP 2\" 3 x 10  To L w/ self OP 2\" 2x10   Scap set w/ low row Grn tubing 2x10 Scap set w/ sh ext to neutral @ [place] 2.5 lbs 1 x 10 B/L  Scap set w/ sh ext to neutral @ [place] 3 lbs 2 x 10 B/L  Grn tubing 2x10   Front raise w/ scap set  1 x 10 B/L pain- free       Cervical retraction w/ shoulder abd supine   1 x 10 YTB     Shoulder ER AROM 3# @ [place] R/L 1 x 10 3# @ [place] R/L 1 x 10 3# @ [place] R/L 1 x 10  RTB against wall 1x10   Shoulder IR 3# @ [place] R/L 1 x 10 3# @ [place] R/L 1 x 10 3# @ [place] R/L 1 x 10     Supermans at wall for LT     5\"x10   Doorway pec stretch  30\" x 3 B/L 30\" x 3 B/L     Prone I, Y, T  1 x 10 ea. B/L  1 x 10 ea. R/L 1 x 12 ea R/L  Prone \"I\"  & \"W'; 2\" 2x10 each   Middle trap row   1 x 10 @ [place] 5#     EDUCATION: Pathology, review of impairements, prognosis, activity modification, POC, and HEP  Posture edu and importance of HEP compliance   See obj - trial L SB w/ op at home; hold contralat stretches   Ther Activity                          Gait Training                          Modalities                          HEP:   Access Code: [email or web address]  URL: [email or web address]  Date: 01/18/2024  Prepared by: Chrissie Lugo    Exercises  - Prone Ts  - 1 x daily - 2 sets - 10 reps  - Prone Scapular Slide with Shoulder Extension  - 1 x daily - 2 sets - 10 reps  - Shoulder External Rotation and Scapular Retraction with Resistance  - 1 x daily - 2 sets - 10 reps  - Prone Single Arm Shoulder Y  - 1 x daily - 2 sets - 10 reps  - Shoulder Flexion Wall Slide with Towel  - 1 x daily - 2 sets - 5 reps             "

## 2024-02-05 ENCOUNTER — APPOINTMENT (OUTPATIENT)
Dept: PHYSICAL THERAPY | Facility: CLINIC | Age: 39
End: 2024-02-05
Payer: COMMERCIAL

## 2024-02-05 ENCOUNTER — TELEPHONE (OUTPATIENT)
Dept: PHYSICAL THERAPY | Facility: CLINIC | Age: 39
End: 2024-02-05

## 2024-02-08 ENCOUNTER — OFFICE VISIT (OUTPATIENT)
Dept: PHYSICAL THERAPY | Facility: CLINIC | Age: 39
End: 2024-02-08
Payer: COMMERCIAL

## 2024-02-08 DIAGNOSIS — M54.2 NECK PAIN ON LEFT SIDE: ICD-10-CM

## 2024-02-08 DIAGNOSIS — M50.322 OTHER CERVICAL DISC DEGENERATION AT C5-C6 LEVEL: Primary | ICD-10-CM

## 2024-02-08 PROCEDURE — 97110 THERAPEUTIC EXERCISES: CPT

## 2024-02-08 NOTE — PROGRESS NOTES
"Daily Note     Today's date: 2024  Patient name: Yue Harris  : 1985  MRN: [id number]  Referring provider: Dereck Milligan MD  Dx:   Encounter Diagnosis     ICD-10-CM    1. Other cervical disc degeneration at C5-C6 level  M50.322       2. Neck pain on left side  M54.2                      Subjective: Pt states that she is feeling better from recent illness. Her neck is feeling pretty good today, she has been managing pain with lateral cervical flexion to left with overpressure.       Objective: See treatment diary below      Assessment: Pt with report of R shoulder pain along distal UT w/ maximal scap retraction and depression; pain resolved with greater cervical retraction ROM. Pt able to progress to 2 set of shoulder IR and ER, however mild R/L insertional UT pain present with shoulder ER. Added rotational snag for UT trap stretch w/out cervical side bend. Repeated cervical lateral flexion to L continues to provide relief. Tolerated treatment well. Patient would benefit from continued PT.       Plan: Continue per plan of care.  Progress treatment as tolerated.       Precautions:   History reviewed. No pertinent past medical history.  History reviewed. No pertinent surgical history.    SOC: 2023  FOTO: 2023  POC Expiration: 2023  Daily Treatment Log  Date: 2024 Next session    Visit#/ auth: 11 12 13 14    Objective Measures                Manuals 5'  5' 5'    STM UT        L cerv SB w/ clinician OP C6 15\"x5  C6 5\"x10 C6 3\" x 10    Nerve glides         Pelvic shot gun (MET)        Neuro Re-Ed  25' 10' (exercises in TE section) 1'    Scapular retraction  5\" x 10 seated  5\" x 10 standing             Ther Ex 40' 10' 30' 35'    Retro UBE   Unable to tolerate     Phy ball slides into max sh flex w/ hip hinge    1 x 5 R/L     Cervical rotation snag    5\" x 5 R/L    B/L UE wall slides On physio ball 2\" x 10  Towel slide to 90 deg 3 x 10  Towel slide to 90 deg " "2 x 10  Towel slide to 120 deg 2 x 10     Cervical side bend To L w/ self OP 2\" 2 x 10    1 x 10 L side bend To L w/ self OP 2\" 2 x 10 To L w/ self OP 2\" 3 x 10 To L w/ self OP 2\" 1 x 10    Scap set w/ low row Grn tubing 2x10 Scap set w/ sh ext to neutral @ [place] 2.5 lbs 1 x 10 B/L  Scap set w/ sh ext to neutral @ [place] 3 lbs 2 x 10 B/L     Front raise w/ scap set  1 x 10 B/L pain- free       Cervical retraction w/ shoulder abd supine   1 x 10 YTB     Shoulder ER AROM 3# @ [place] R/L 1 x 10 3# @ [place] R/L 1 x 10 3# @ [place] R/L 1 x 10 3# @ [place] R/L 2 x 10    Shoulder IR 3# @ [place] R/L 1 x 10 3# @ [place] R/L 1 x 10 3# @ [place] R/L 1 x 10 3# @ [place] R/L 2 x 10    Supermans at wall for LT        Doorway pec stretch  30\" x 3 B/L 30\" x 3 B/L     Prone I, Y, T  1 x 10 ea. B/L  1 x 10 ea. R/L 1 x 12 ea R/L 2 x 10 ea R/L    Middle trap row   1 x 10 @ [place] 5#     EDUCATION: Pathology, review of impairements, prognosis, activity modification, POC, and HEP  Posture edu and importance of HEP compliance      Ther Activity                          Gait Training                          Modalities                          HEP:   Access Code: [email or web address]  URL: [email or web address]  Date: 01/18/2024  Prepared by: Chrissie Lugo    Exercises  - Prone Ts  - 1 x daily - 2 sets - 10 reps  - Prone Scapular Slide with Shoulder Extension  - 1 x daily - 2 sets - 10 reps  - Shoulder External Rotation and Scapular Retraction with Resistance  - 1 x daily - 2 sets - 10 reps  - Prone Single Arm Shoulder Y  - 1 x daily - 2 sets - 10 reps  - Shoulder Flexion Wall Slide with Towel  - 1 x daily - 2 sets - 5 reps               "

## 2024-02-12 ENCOUNTER — OFFICE VISIT (OUTPATIENT)
Dept: PHYSICAL THERAPY | Facility: CLINIC | Age: 39
End: 2024-02-12
Payer: COMMERCIAL

## 2024-02-12 DIAGNOSIS — M54.2 NECK PAIN ON LEFT SIDE: ICD-10-CM

## 2024-02-12 DIAGNOSIS — M50.322 OTHER CERVICAL DISC DEGENERATION AT C5-C6 LEVEL: Primary | ICD-10-CM

## 2024-02-12 PROCEDURE — 97112 NEUROMUSCULAR REEDUCATION: CPT

## 2024-02-12 PROCEDURE — 97110 THERAPEUTIC EXERCISES: CPT

## 2024-02-12 NOTE — PROGRESS NOTES
"Daily Note     Today's date: 2024  Patient name: Yue Harris  : 1985  MRN: [id number]  Referring provider: Dereck Milligan MD  Dx:   Encounter Diagnosis     ICD-10-CM    1. Other cervical disc degeneration at C5-C6 level  M50.322       2. Neck pain on left side  M54.2                      Subjective: Pt states that she is a bit sore this morning due to a lot of computer work over the weekend. She feels she is almost ready to discharge to HEP. Would like to reduce frequency of skilled PT to trial HEP and assess response before discontinuing PT.       Objective: See treatment diary below      Assessment: Pt continues to require verbal cue for postural correction with UE AROM/ strengthening including abdominal and deep neck flexor engagement. HEP updated and reviewed. Plan to decreased frequency of PT visit to assess response to independent HEP prior to discharge. Tolerated treatment well. Patient would benefit from continued PT.       Plan: Continue per plan of care.  Progress treatment as tolerated.       Precautions:   History reviewed. No pertinent past medical history.  History reviewed. No pertinent surgical history.    SOC: 2023  FOTO: 2023  POC Expiration: 2023  Daily Treatment Log  Date: Next session  2024    Visit#/ auth:   13 14 15   Objective Measures                Manuals   5' 5'    STM UT        L cerv SB w/ clinician OP   C6 5\"x10 C6 3\" x 10    Nerve glides         Pelvic shot gun (MET)        Neuro Re-Ed   10' (exercises in TE section) 1' 15' (exercises in TE section)   Scapular retraction    5\" x 10 standing             Ther Ex   30' 35' 25'   Retro UBE   Unable to tolerate     Phy ball slides into max sh flex w/ hip hinge    1 x 5 R/L  15\" x 6 B/L   Cervical rotation snag    5\" x 5 R/L 5\" x 5 R/L   B/L UE wall slides   Towel slide to 90 deg 2 x 10  Towel slide to 120 deg 2 x 10  Towel slide to 120 deg 1 x 10    Cervical side bend   To L w/ self OP 2\" " "3 x 10 To L w/ self OP 2\" 1 x 10 To L w/ self OP 2\" 2 x 10   Scap set w/ low row   Scap set w/ sh ext to neutral @ [place] 3 lbs 2 x 10 B/L     Front raise w/ scap set      Back at wall to 90 deg 1 x 10 B/L   Cervical retraction w/ shoulder abd supine   1 x 10 YTB  Cervical retraction vs YTB 3\" x 10   Shoulder ER AROM   3# @ [place] R/L 1 x 10 3# @ [place] R/L 2 x 10 4# @ [place] R/L 2 x 10   Shoulder IR   3# @ [place] R/L 1 x 10 3# @ [place] R/L 2 x 10 4# @ [place] R/L 2 x 10   Doorway pec stretch   30\" x 3 B/L  30\" x 3 B/L   Prone I, Y, T    1 x 12 ea R/L 2 x 10 ea R/L 2 x 10 Ys R/L   Middle trap row   1 x 10 @ [place] 5#  1 x 12 @ [place] 5#   EDUCATION: Pathology, review of impairements, prognosis, activity modification, POC, and HEP        Ther Activity                          Gait Training                          Modalities                          HEP:   Access Code: [email or web address]  URL: [email or web address]  Date: 02/12/2024  Prepared by: Chrissie Lugo    Exercises  - Prone Ts  - 1 x daily - 2 sets - 10 reps  - Prone Scapular Slide with Shoulder Extension  - 1 x daily - 2 sets - 10 reps  - Prone Single Arm Shoulder Y  - 1 x daily - 2 sets - 10 reps  - Shoulder Flexion Wall Slide with Towel  - 1 x daily - 2 sets - 8 reps  - Shoulder Internal Rotation with Resistance  - 1 x daily - 2 sets - 10 reps  - Shoulder External Rotation with Anchored Resistance  - 1 x daily - 2 sets - 10 reps     "

## 2024-02-15 ENCOUNTER — OFFICE VISIT (OUTPATIENT)
Dept: PHYSICAL THERAPY | Facility: CLINIC | Age: 39
End: 2024-02-15
Payer: COMMERCIAL

## 2024-02-15 DIAGNOSIS — M54.2 NECK PAIN ON LEFT SIDE: ICD-10-CM

## 2024-02-15 DIAGNOSIS — M50.322 OTHER CERVICAL DISC DEGENERATION AT C5-C6 LEVEL: Primary | ICD-10-CM

## 2024-02-15 PROCEDURE — 97110 THERAPEUTIC EXERCISES: CPT

## 2024-02-15 NOTE — PROGRESS NOTES
"Daily Note     Today's date: 2/15/2024  Patient name: Yue Harris  : 1985  MRN: [id number]  Referring provider: Dereck Milligan MD  Dx:   Encounter Diagnosis     ICD-10-CM    1. Other cervical disc degeneration at C5-C6 level  M50.322       2. Neck pain on left side  M54.2                      Subjective: Pt states that she feels pretty good today, \"not too much stiffness or soreness\"       Objective: See treatment diary below      Assessment: . Tolerated treatment well with no pain reported t/o session. Patient edu if she has any increase in pain during her hold with HEP only to call clinic and we can try to get her in sooner then her next scheduled appt. Also that she could just call if she had HEP related questions, but did not necessarily feel worse. Patient would benefit from continued PT. Primary PT previously discussed plan to decreased frequency of PT visit to assess response to independent HEP prior to discharge      Plan: Continue per plan of care.  Progress treatment as tolerated.       Precautions:   History reviewed. No pertinent past medical history.  History reviewed. No pertinent surgical history.    SOC: 2023  FOTO: 2023  POC Expiration: 2023  Daily Treatment Log  Date: 2/15/2024  2024 2024 2/12    Visit#/ auth: 16  13 14 15   Objective Measures                Manuals   5' 5'    STM UT        L cerv SB w/ clinician OP   C6 5\"x10 C6 3\" x 10    Nerve glides         Pelvic shot gun (MET)        Neuro Re-Ed   10' (exercises in TE section) 1' 15' (exercises in TE section)   Scapular retraction    5\" x 10 standing             Ther Ex 40'  30' 35' 25'   Retro UBE   Unable to tolerate     Phy ball slides into max sh flex w/ hip hinge 15\" x 5 R/L   1 x 5 R/L  15\" x 6 B/L   Cervical rotation snag 5\" x 5 R/L   5\" x 5 R/L 5\" x 5 R/L   B/L UE wall slides Towel slide to 120 deg 15\" x 5 R/L  Towel slide to 90 deg 2 x 10  Towel slide to 120 deg 2 x 10  Towel slide to 120 deg 1 x " "10    Cervical side bend To L w/ self OP 2\" 2 x 10  To L w/ self OP 2\" 3 x 10 To L w/ self OP 2\" 1 x 10 To L w/ self OP 2\" 2 x 10   Scap set w/ low row   Scap set w/ sh ext to neutral @ [place] 3 lbs 2 x 10 B/L     Front raise w/ scap set  Back at wall to 90 deg 1 x 10 B/L    Back at wall to 90 deg 1 x 10 B/L   Cervical retraction w/ shoulder abd supine Cervical retraction vs YTB 3\" x 10  1 x 10 YTB  Cervical retraction vs YTB 3\" x 10   Shoulder ER AROM 4# @ [place] R/L 2 x 10  3# @ [place] R/L 1 x 10 3# @ [place] R/L 2 x 10 4# @ [place] R/L 2 x 10   Shoulder IR 4# @ [place] R/L 2 x 10  3# @ [place] R/L 1 x 10 3# @ [place] R/L 2 x 10 4# @ [place] R/L 2 x 10   Doorway pec stretch 30\" x 3 B/L  30\" x 3 B/L  30\" x 3 B/L   Prone I, Y, T  2 x 10 Ys R/L  1 x 12 ea R/L 2 x 10 ea R/L 2 x 10 Ys R/L   Middle trap row   1 x 10 @ [place] 5#  1 x 12 @ [place] 5#   EDUCATION: Pathology, review of impairements, prognosis, activity modification, POC, and HEP Discussed HEP and edu in assessment section        Ther Activity                          Gait Training                          Modalities                          HEP:   Access Code: [email or web address]  URL: [email or web address]  Date: 02/12/2024  Prepared by: Chrissie Lugo    Exercises  - Prone Ts  - 1 x daily - 2 sets - 10 reps  - Prone Scapular Slide with Shoulder Extension  - 1 x daily - 2 sets - 10 reps  - Prone Single Arm Shoulder Y  - 1 x daily - 2 sets - 10 reps  - Shoulder Flexion Wall Slide with Towel  - 1 x daily - 2 sets - 8 reps  - Shoulder Internal Rotation with Resistance  - 1 x daily - 2 sets - 10 reps  - Shoulder External Rotation with Anchored Resistance  - 1 x daily - 2 sets - 10 reps     "

## 2024-02-29 ENCOUNTER — TELEPHONE (OUTPATIENT)
Dept: PHYSICAL THERAPY | Facility: CLINIC | Age: 39
End: 2024-02-29

## 2024-02-29 ENCOUNTER — APPOINTMENT (OUTPATIENT)
Dept: PHYSICAL THERAPY | Facility: CLINIC | Age: 39
End: 2024-02-29
Payer: COMMERCIAL

## 2024-02-29 DIAGNOSIS — M50.322 OTHER CERVICAL DISC DEGENERATION AT C5-C6 LEVEL: Primary | ICD-10-CM

## 2024-02-29 DIAGNOSIS — M54.2 NECK PAIN ON LEFT SIDE: ICD-10-CM

## 2024-02-29 NOTE — TELEPHONE ENCOUNTER
Called pt to inform her of expiring POC. Left message asking for call back.    Pt returned call; asked pt if she plans to continue w/ PT or if she feels confident D/C to HEP. Pt stating that she plans to think about whether or not she needs extended POC. Appt canceled today.     Chrissie Lugo, PT, DPT

## 2024-02-29 NOTE — PROGRESS NOTES
PT Re-Evaluation     Today's date: 2024  Patient name: Yue Harris  : 1985  MRN: [id number]  Referring provider: Dereck Milligan MD  Dx:   Encounter Diagnosis     ICD-10-CM    1. Other cervical disc degeneration at C5-C6 level  M50.322       2. Neck pain on left side  M54.2                      Assessment  Assessment details: 2023: Yue Harris is a 38 y.o. L hand dominant female who presents with chief complaint of reoccurring L neck pain. Pt presents with limited cervical AROM, positive cervical distraction test, LUE weakness, bilateral middle/ lower trap weakness, bilateral latissimus dorsi weakness, and limited L shoulder AROM. Symptoms respond with repeated cervical retraction with cervical and thoracic extension upon mechanical assessment to determine directional preference. Due to presenting body impairments, pt is unable to maintain prolonged positions including sitting for greater than 1 hour or forward reaching of bilateral UE affecting her ability to sit in Ho[employer]ism, clean, and engage in recreational activities. Pt educated in pathology, review of impairements, prognosis, activity modification, POC, and HEP. Pt will benefit from skilled physical therapy to address deficits in range of motion, strength and function and return to PLOF by reaching short and long term goals.    2024: Yue Harris is a 38 y.o. female with complaint of L neck pain. Pt demonstrates improved cervical spine AROM and UE myotome strength bilaterally. Despite improvements with PT, pt demonstrates middle/ lower trap and latissimus dorsi weakness bilaterally, impaired cervical spine AROM,  LUE AROM deficits, and LUE C7 myotome weakness. Symptoms no longer responding to repeated cervical retraction/ extension. Patients body impairments are limiting her tolerance to static positions. Pt is limited in her ability to sit or reach arms out in front affecting her participation in cleaning tasks, playing board  games, working on computer, and sitting for long car rides. Pt educated in progress with PT, remaining impairments, activity modification, POC, and HEP. Pt will benefit from skilled physical therapy to address deficits in range of motion, strength and function and return to PLOF by reaching short and long term goals. Plan to focus on strengthening of periscapular musculature in upcoming session to prevent over utilization of upper trapezius.     2/29/2024:   Impairments: abnormal or restricted ROM, activity intolerance, lacks appropriate home exercise program, pain with function, poor posture  and poor body mechanics  Understanding of Dx/Px/POC: good   Prognosis: good    Goals  Short Tem Goals to be met in 4 weeks (target date 1/3/2024)  1. Pt to be independent w/ HEP in order to demonstrate active participation in recovery. (MET 1/11/2024)  2. Reduce c/o pain to 0-3/10 with activity and prolonged positions in order to attend Jainism/ participate in cleaning activities. (Progressing 1/11/2024)  3. Restore cervical AROM to nil limitation w/o pain in order to return to driving w/out compensatory trunk rotation. (Progressing 1/11/2024)  4. Pt will improve L shoulder flex AROM to be within 5 degrees R in order to reach items off top shelves. (Ongoing 1/11/2024)    Long Term Goal to be met in 12 weeks (target date 2/28/2024)  1. Pt will demonstrate improved L shoulder flex AROM by 10 degrees in order to lift clean w/ dominant UE. (Ongoing 1/11/2024)  2. Pt will be able to hold bilateral UE at 90 degrees for 60 seconds w/ correct posture to demonstrate improved muscular endurance necessary for mopping and scrubbing tub. (Progressing 1/11/2024)  3. Pt will improve B/L middle/ lower trap and latissimus dorsi strength to 5/5 to allow lifting and carrying necessary for improved posture w/ functional activities. (Progressing 1/11/2024)  4. Pt to achieve full symptom prevention/resolution via HEP. (Ongoing  1/11/2024)    Plan  Plan details: HEP development and progression, monitor patients adherence to activity modification to ensure adequate healing time/ recovery. Implement stretching, A/AA/PROM, joint mobilizations, posture education, STM/MI as needed to reduce muscle tension, muscle reeducation. Progress strengthening; improve pt's tolerance to UE WB and functional activity. POC discussed and agreed upon with patient.   Planned modality interventions: traction, unattended electrical stimulation and thermotherapy: hydrocollator packs  Planned therapy interventions: joint mobilization, manual therapy, abdominal trunk stabilization, patient education, postural training, stretching, transfer training, home exercise program and strengthening  Frequency: 2-3x/week.  Plan of Care beginning date: 12/6/2023  Plan of Care expiration date: 2/28/2024  Treatment plan discussed with: patient      Subjective Evaluation    History of Present Illness  Mechanism of injury: 12/6/20243 Yue is a 38 y.o. female who returns to PT for evaluation with chief complaint of L neck pain. Pt states that has received PT treatment previously which offered relief. States that the pain she is experiencing is due to a reoccurring injury. Pain returned in May of 2023 while getting down to clean her tub; states that she re-aggravated her neck. She experienced bilateral neck pain and numbness/tingling into her hands. She restarted her home PT exercises and began to experience improvement in symptoms. States that she was doing well into August. However 3 months ago she states that even her home PT exercises, ice, home traction unit, Tylenol and Advil did not provide relief. States that she would sit in [employer] for 1 hour and have to return home immediately afterward to lie down. States that her visit w/ MD for current pain was 1 month ago; due to busy schedule she was unable to attend PT initially. States that MD provided muscle relaxer which has  "improved her symptoms and left cervical spine muscle spasm. Reports that numbness and tingling has resolved. She did obtain x-ray imaging indicating increase in C5-C6 degeneration since previous imaging. Exacerbating activities include sitting, mopping the floor, scrubbing bath, and high impact activity (pt uses example of hiking). Pt has 3 children; states that two of her children have ADHD, therefore for her role as a mother is highly active. States that her ROM is limited with driving. Lifting overhead is not an issue however, pt notices that she utilizes compensatory movements. Pt works a part time psychologist 2 days/ week.     2024: Pt states that on  she was playing a puzzle which caused an increase in her pain. Pain continues to present on L side of neck. Driving her son to doctors appt 40 min away and sitting in work chair bothered her as sitting for prolonged periods continue to bother her. Tuesday she had a headache and neck pain; attributes headache to being hormone related. Felt like stretches weren't doing anything. Yesterday was better; was at home, home schooling. She feels better when she switches her activity t/o day. Pain no longer goes into the hand. Occasionally would feel \"twinge into L arm with L cervical lateral flexion. No longer requires supine rest after Anabaptism.     2024:   Patient Goals  Patient goals for therapy: decreased pain, increased strength, independence with ADLs/IADLs, return to sport/leisure activities and increased motion  Patient goal: Return to ADLs w/out limitation  Pain  Current pain ratin  At best pain ratin  At worst pain ratin  Location: L upper trapezius; primary pain at level of C4/C5/C6  Quality: dull ache, sharp and burning (Dull/ constant)  Aggravating factors: sitting and lifting (Prolonged positions, bending forward)    Hand dominance: left      Diagnostic Tests  X-ray: abnormal  Treatments  Previous treatment: medication and physical " therapy (FLEXERIL PRN)  Current treatment: physical therapy      Objective  SPECIAL TESTS:      2023  Alar ligament:    (-)  NT  Sharp Kylie:    NT  NT  Spurling's (sagittal plane):  (-)  NT  Distraction:    (+)  NT    2023  Pt w/ report of intermittent headaches. Pt denies dizziness and diplopia currently. Pt denies nasea/vomiting. Pt states that in August when symptom irritability was high she experiences brief episodic dizziness.     2024  Pt denies headaches, dizziness, nausea, and vomiting.     2024:   ***    DERMATOMAL TESTING     2023     RIGHT  LEFT  RIGHT  LEFT  RIGHT  LEFT     (Light touch) (Light touch)    C1 top of head:  intact  Intact  NT  NT  C2 behind ear:  intact  intact  NT  NT  C3 lat/ant neck:  intact  intact  NT  NT  C4  upper trap:  intact  intact  NT  NT  C5 lat arm:   intact  intact  NT  NT  C6 thumb:   intact  intact  NT  NT  C7 middle finger:  intact  intact  NT  NT  C8 5th finger:   intact  intact  NT  NT    MYOTOMAL TESTING     2023     R  L  R  L  R  L  C4 Shru/5  5/5  5/5  5/5  C5 shld abd:   4+/5  4+/5  5/5  5/5  C6 bicep:    4/5  4+/5  5/5  5/5  C7 tricep:   5/5  4/5  5/5  4/5  C8 finger flexion:  5/5  4/5   5/5  5/5    MMT:     2023     R  L  R  L   Middle trap  3+/5  3+/5  3+/5  3+/5   Lower trap/lat   3+/5  3+/5  4-/5  3+/5    DTR's   2023  Biceps (C5-6):  NT  2+ B/L  NT  Brachioradialis (C6):  NT  2+ B/L  NT  Triceps (C7):   NT  2+ B/L  NT    UE AROM standin2023     Right  Left  Right  Left  Right  Left  Shoulder flexion: 180  180  180  180  Shoulder abduction: 180  170  180  170  Shoulder ER @90 95  90  92  90  Funct IR behind back T1  Spine of scap C7  T1      MOBILITY  "ASSESSMENT:   12/6/2023 1/11/2024 2/29/2024  Cervical AROM limitation  Flexion      nil cordero*  Min cordero            Extension   min cordero  Nil cordero            R rotation    min cordero  Min cordero             L rotation    min cordero  Nil cordero * UT             R Sidebend   min cordero  Nil cordero *             L sidebend   min cordero   Nil cordero *              Retraction     nil cordero  Nil cordero             Protraction    nil cordero  Nil cordero    UPPER LIMB TENSION TESTS:  12/6/2023:    Median, Ulnar and Radial nerve tension tests R = NT  Median, Ulnar and Radial nerve Tension tests L = NT    12/6/2023:    Median, Ulnar and Radial nerve tension tests R = negative  Median, Ulnar and Radial nerve Tension tests L = unlar nerve tesnion +    2/29/2024:  ***    MECHANICAL ASSESSMENT  12/6/2023  Pretest symptoms include 4/10 pain at level of C4 on L, along length of UT  Repeated cervical retraction w/ OP 5\" x 10 = increase during, NE at completion  Repeated cervical retraction w/OP and extension 1 x 10 = NE  Repeated cervical retraction w/OP, cervical and thoracic extension 1 x 7 (d/c d/t pain) = painful during, centralized afterward, pt report of min improvement in pain, no pain scale provided.    1/11/2024  Pretest symptoms include 3-4/10 pain at level of C4 on L, along length of UT primarily @ transverse process C5-C7   Repeated cervical retraction and extension w/ OP @ C7 transverse process = 4/10 pain location unchanged   Repeated cervical retraction off edge w/ manual assist into cervical extension = 4/10 pain, pain into L medial scapular border  2/29/2024    Palpation:  12/6/2023: Pt denies TTP along transverse process of C-spine bilaterally. Pt w/ increased palpable tension of L upper trapezius despite pt report of no pain.  1/11/2024: Pt denies TTP along C3-C7 spinous process.   2/29/2024    FUNCTION:   12/6/2023:   Pt reports limited tolerance to sitting; 1 hr at [employer] needs to return home and lie down  Cleaning task: unable to mob/ scrub " "floor  1/11/2024  Pt reports limited tolerance to sitting; no longer needs supine rest after 1 hr of Yazidism however  Cleaning task: was able to return to cleaning initially after start of PT but as of recent has had pain w/ cleaning tasks.   2/29/2024       Precautions:   History reviewed. No pertinent past medical history.  History reviewed. No pertinent surgical history.    SOC: 12/6/2023  FOTO: 12/26/2023  POC Expiration: 2/28/2023  Daily Treatment Log  Date: 2/15/2024  2/1/2024 2/8/2024 2/12    Visit#/ auth: 16  13 14 15   Objective Measures                Manuals   5' 5'    STM UT        L cerv SB w/ clinician OP   C6 5\"x10 C6 3\" x 10    Nerve glides         Pelvic shot gun (MET)        Neuro Re-Ed   10' (exercises in TE section) 1' 15' (exercises in TE section)   Scapular retraction    5\" x 10 standing             Ther Ex 40'  30' 35' 25'   Retro UBE   Unable to tolerate     Phy ball slides into max sh flex w/ hip hinge 15\" x 5 R/L   1 x 5 R/L  15\" x 6 B/L   Cervical rotation snag 5\" x 5 R/L   5\" x 5 R/L 5\" x 5 R/L   B/L UE wall slides Towel slide to 120 deg 15\" x 5 R/L  Towel slide to 90 deg 2 x 10  Towel slide to 120 deg 2 x 10  Towel slide to 120 deg 1 x 10    Cervical side bend To L w/ self OP 2\" 2 x 10  To L w/ self OP 2\" 3 x 10 To L w/ self OP 2\" 1 x 10 To L w/ self OP 2\" 2 x 10   Scap set w/ low row   Scap set w/ sh ext to neutral @ Trice 3 lbs 2 x 10 B/L     Front raise w/ scap set  Back at wall to 90 deg 1 x 10 B/L    Back at wall to 90 deg 1 x 10 B/L   Cervical retraction w/ shoulder abd supine Cervical retraction vs YTB 3\" x 10  1 x 10 YTB  Cervical retraction vs YTB 3\" x 10   Shoulder ER AROM 4# @ Trice R/L 2 x 10  3# @ Trice R/L 1 x 10 3# @ Trice R/L 2 x 10 4# @ [place] R/L 2 x 10   Shoulder IR 4# @ [place] R/L 2 x 10  3# @ [place] R/L 1 x 10 3# @ Trice R/L 2 x 10 4# @ Trice R/L 2 x 10   Doorway pec stretch 30\" x 3 B/L  30\" x 3 B/L  30\" x 3 B/L   Prone I, Y, T  2 x 10 Ys R/L  1 x 12 ea R/L 2 x " 10 ea R/L 2 x 10 Ys R/L   Middle trap row   1 x 10 @ [place] 5#  1 x 12 @ [place] 5#   EDUCATION: Pathology, review of impairements, prognosis, activity modification, POC, and HEP Discussed HEP and edu in assessment section        Ther Activity                          Gait Training                          Modalities                          HEP:   Access Code: [email or web address]  URL: [email or web address]  Date: 02/12/2024  Prepared by: Chrissie Lugo    Exercises  - Prone Ts  - 1 x daily - 2 sets - 10 reps  - Prone Scapular Slide with Shoulder Extension  - 1 x daily - 2 sets - 10 reps  - Prone Single Arm Shoulder Y  - 1 x daily - 2 sets - 10 reps  - Shoulder Flexion Wall Slide with Towel  - 1 x daily - 2 sets - 8 reps  - Shoulder Internal Rotation with Resistance  - 1 x daily - 2 sets - 10 reps  - Shoulder External Rotation with Anchored Resistance  - 1 x daily - 2 sets - 10 reps

## 2024-03-05 ENCOUNTER — TELEPHONE (OUTPATIENT)
Dept: PHYSICAL THERAPY | Facility: CLINIC | Age: 39
End: 2024-03-05

## 2024-03-05 NOTE — TELEPHONE ENCOUNTER
LM to ask how patient was feeling and determine whether or not she plans to continue PT.     Chrissie Lugo, PT, DPT